# Patient Record
Sex: MALE | Race: WHITE | NOT HISPANIC OR LATINO | Employment: OTHER | URBAN - METROPOLITAN AREA
[De-identification: names, ages, dates, MRNs, and addresses within clinical notes are randomized per-mention and may not be internally consistent; named-entity substitution may affect disease eponyms.]

---

## 2017-01-06 ENCOUNTER — HOSPITAL ENCOUNTER (OUTPATIENT)
Dept: CARDIOLOGY | Facility: HOSPITAL | Age: 77
Discharge: HOME | End: 2017-01-06
Attending: INTERNAL MEDICINE | Admitting: INTERNAL MEDICINE

## 2017-01-06 VITALS
RESPIRATION RATE: 18 BRPM | SYSTOLIC BLOOD PRESSURE: 154 MMHG | BODY MASS INDEX: 26.48 KG/M2 | DIASTOLIC BLOOD PRESSURE: 86 MMHG | WEIGHT: 185 LBS | HEIGHT: 70 IN

## 2017-01-06 DIAGNOSIS — R06.02 SHORTNESS OF BREATH: ICD-10-CM

## 2017-01-06 PROCEDURE — 93017 CV STRESS TEST TRACING ONLY: CPT

## 2017-01-06 PROCEDURE — 25010000002 SULFUR HEXAFLUORIDE MICROSPH 60.7-25 MG RECONSTITUTED SUSPENSION: Performed by: INTERNAL MEDICINE

## 2017-01-06 PROCEDURE — 93018 CV STRESS TEST I&R ONLY: CPT | Performed by: INTERNAL MEDICINE

## 2017-01-06 PROCEDURE — 93351 STRESS TTE COMPLETE: CPT

## 2017-01-06 PROCEDURE — 93350 STRESS TTE ONLY: CPT | Performed by: INTERNAL MEDICINE

## 2017-01-06 PROCEDURE — C8928 TTE W OR W/O FOL W/CON,STRES: HCPCS

## 2017-01-06 PROCEDURE — 93350 STRESS TTE ONLY: CPT

## 2017-01-06 RX ADMIN — SULFUR HEXAFLUORIDE 5 ML: KIT at 15:12

## 2017-01-12 LAB
BH CV ECHO MEAS - BSA(HAYCOCK): 2 M^2
BH CV ECHO MEAS - BSA: 2 M^2
BH CV ECHO MEAS - BZI_BMI: 27.3 KILOGRAMS/M^2
BH CV ECHO MEAS - BZI_METRIC_HEIGHT: 175.3 CM
BH CV ECHO MEAS - BZI_METRIC_WEIGHT: 83.9 KG
BH CV STRESS BP STAGE 1: NORMAL
BH CV STRESS BP STAGE 2: NORMAL
BH CV STRESS DURATION MIN STAGE 1: 3
BH CV STRESS DURATION MIN STAGE 2: 2
BH CV STRESS DURATION SEC STAGE 1: 0
BH CV STRESS DURATION SEC STAGE 2: 5
BH CV STRESS GRADE STAGE 1: 10
BH CV STRESS GRADE STAGE 2: 12
BH CV STRESS HR STAGE 1: 116
BH CV STRESS HR STAGE 2: 137
BH CV STRESS METS STAGE 1: 5
BH CV STRESS METS STAGE 2: 7.5
BH CV STRESS PROTOCOL 1: NORMAL
BH CV STRESS RECOVERY BP: NORMAL MMHG
BH CV STRESS RECOVERY HR: 67 BPM
BH CV STRESS SPEED STAGE 1: 1.7
BH CV STRESS SPEED STAGE 2: 2.5
BH CV STRESS STAGE 1: 1
BH CV STRESS STAGE 2: 2
MAXIMAL PREDICTED HEART RATE: 146 BPM
PERCENT MAX PREDICTED HR: 93.84 %
STRESS BASELINE BP: NORMAL MMHG
STRESS BASELINE HR: 59 BPM
STRESS PERCENT HR: 110 %
STRESS POST ESTIMATED WORKLOAD: 7 METS
STRESS POST EXERCISE DUR MIN: 5 MIN
STRESS POST EXERCISE DUR SEC: 5 SEC
STRESS POST PEAK BP: NORMAL MMHG
STRESS POST PEAK HR: 137 BPM
STRESS TARGET HR: 124 BPM

## 2017-10-25 ENCOUNTER — OFFICE VISIT (OUTPATIENT)
Dept: CARDIOLOGY | Facility: CLINIC | Age: 77
End: 2017-10-25

## 2017-10-25 VITALS
HEART RATE: 64 BPM | SYSTOLIC BLOOD PRESSURE: 141 MMHG | HEIGHT: 69 IN | BODY MASS INDEX: 27.16 KG/M2 | WEIGHT: 183.4 LBS | DIASTOLIC BLOOD PRESSURE: 92 MMHG

## 2017-10-25 DIAGNOSIS — Z95.2 S/P AVR (AORTIC VALVE REPLACEMENT): ICD-10-CM

## 2017-10-25 DIAGNOSIS — I10 ESSENTIAL HYPERTENSION: ICD-10-CM

## 2017-10-25 DIAGNOSIS — E78.5 DYSLIPIDEMIA: ICD-10-CM

## 2017-10-25 DIAGNOSIS — I25.9 ISCHEMIC HEART DISEASE: Primary | ICD-10-CM

## 2017-10-25 PROCEDURE — 99214 OFFICE O/P EST MOD 30 MIN: CPT | Performed by: INTERNAL MEDICINE

## 2017-10-25 NOTE — PROGRESS NOTES
Subjective:     Encounter Date:10/25/2017    Patient ID: Sherron Boyle is a 77 y.o.  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     PHYSICIAN:  Sandy Corcoran MD  REMOTE INTERNIST: Jose Martin Munguia MD  CARDIOVASCULAR SURGEON: Kali Curry MD   UROLOGIST:  Jayce Delgado MD  SLEEP MD:  Khloe Caputo MD    Chief Complaint:   Chief Complaint   Patient presents with   • Shortness of Breath     Problem List:  1. 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 Remote progressive NYHA class II-III exertional dyspnea/fatigue syndrome with CCS class II-III chest pain syndrome with abnormal EKG and abnormal echocardiogram with acceptable Cardiolite GXT. LVEF (0.58), July 2006.   b. Severe 2.5-vessel obstructive coronary artery disease with 3-vessel coronary disease and severe aortic stenosis with subsequent aortocoronary bypass graft placement x2 (RSVG PDA and RCA; RSVG distal OM nondominant LCx) with #21 Magna precordial aortic valve replacement and reduction aortic valve annuloplasty with possible small non ST elevation myocardial infarction and acceptable discharge echocardiographic study, August 2006.   c. Residual CCS class I anginal pectoris/NYHA class II exertional dyspnea/fatigue syndrome with acceptable echocardiogram. LVEF (0.62), December 2007.   d. BRENDA revealing grade II atheromatous disease of the transverse aorta and evidence of grade III atheroma in the descending aorta (atheroma less than or equal to 5 mm) with mild concentric LVH, normal sinus rhythm with negative IV saline contrast study and LVEF estimated at greater than 60%, 09/17/2009.  e. Acceptable echocardiographic GXT. LVEF (0.64) with nominal aortic valve function and mild left atrial enlargement with acceptable ascending, transverse, and proximal descending thoracic aorta, November  2009.  f. Remote dizziness/presyncope with transient chest pain and abnormal event recorder with acceptable combination Doppler echocardiogram and carotid duplex study as well as Cardiolite GXT, spring 2013, with residual class I symptoms.  g. Residual class I symptoms with acceptable stable echocardiogram. LVEF (0.72), December 2011; LVEF (0.65), November 2014.  h. Remote CCS class II chest pain/NYHA class II exertional dyspnea with acceptable limited echocardiographic GXT with exercise to 93% PMHR and only 75% predicted exercise capacity, January 2017.  i. Residual class I symptoms.  2. Chronic hypertension, probable essential.   3. Dyslipidemia.  4. Remote tobacco use, resolved.   5. Remote pneumonia, 1970.  6. Remote football injury with concussion, 1950.  7. Chronic lower tract obstructive symptoms, probable BPH.   8. Post-procedure weight gain and possible mild volume overload, October 2006.  9. Chronic hypothyroidism/replacement therapy.   10. Apparent bladder cancer with therapy and intermittent recurrent cystoscopy, autumn 2006.  11. Remote left podagra/possible gout attack -- data deficit, November 2008.  12. Erectile dysfunction.   13. Stage 3 chronic kidney disease.   14. Sleep apnea with abnormal outpatient screening study with subsequent apparent polysomnogram, data deficit (March 2016).      Allergies   Allergen Reactions   • Lipitor [Atorvastatin] Myalgia     arthralgias/myalgias.   • Lisinopril Cough     paroxysmal cough.         Current Outpatient Prescriptions:   •  aspirin 81 MG EC tablet, Take 81 mg by mouth Daily., Disp: , Rfl:   •  atorvastatin (LIPITOR) 20 MG tablet, 40 mg Daily., Disp: , Rfl:   •  levothyroxine (SYNTHROID, LEVOTHROID) 25 MCG tablet, Daily., Disp: , Rfl:     HISTORY OF PRESENT ILLNESS: Patient returns for scheduled 10-month followup. He has done well since last being seen in our office; he has been push-mowing 3 lots since his riding mower broke down and has no symptoms from  "a cardiopulmonary perspective with this activity.  He also walks some and takes his dog for a walk and does some golfing.  He has not been skiing anymore but is planning a trip to [place] next year with the friend that he used to ski with.  The results of his stress test in January 2017 are discussed with him.  He continues to do some work at the [employer].  He states that he has noticed in the past few weeks a soreness in his chest; he notes it is not a pain but is there all the time.  It does not get worse with activity.  He gets a little short of breath with going up and down stairs, but the mowing does not bother him.  He reports that he has changed physicians because his wife is no longer working for Dr. Munguia; he notes that Dr. Corcoran, his new doctor, took him off a lot of his medicines.  He has already had influenza immunization.  He states that he just had bilateral arterial studies on his legs and will print the report for us and mail it to our office.  Patient otherwise denies prolonged chest pain, shortness of breath, PND, edema, palpitations, syncope or presyncope at this time.        Review of Systems   Musculoskeletal: Positive for arthritis.      Obtained and otherwise negative except as outlined in problem list and HPI.    Procedures       Objective:       Vitals:    10/25/17 1134 10/25/17 1135   BP: 128/83 141/92   BP Location: Right arm Right arm   Patient Position: Sitting Standing   Pulse: 59 64   Weight: 183 lb 6.4 oz (83.2 kg)    Height: 69\" (175.3 cm)      Body mass index is 27.08 kg/(m^2).   Last weight:  186 lbs.    Physical Exam   Constitutional: He is oriented to person, place, and time. He appears well-developed and well-nourished.   HENT:   Mouth/Throat: Oropharynx is clear and moist and mucous membranes are normal. He does not have dentures. No oral lesions. Abnormal dentition. No dental abscesses, uvula swelling, lacerations or dental caries.   Neck: No JVD present. Carotid bruit is not " present. No thyromegaly present.   Cardiovascular: Regular rhythm, S1 normal and S2 normal.  Exam reveals no gallop, no S3 and no friction rub.    Murmur heard.   Medium-pitched harsh early systolic murmur is present with a grade of 2/6  at the upper right sternal border radiating to the neck  Pulses:       Carotid pulses are 1+ on the right side, and 1+ on the left side.       Radial pulses are 1+ on the right side, and 1+ on the left side.        Femoral pulses are 1+ on the right side, and 1+ on the left side.       Popliteal pulses are 1+ on the right side, and 1+ on the left side.        Dorsalis pedis pulses are 1+ on the right side, and 1+ on the left side.        Posterior tibial pulses are 1+ on the right side, and 1+ on the left side.   Pulmonary/Chest: Effort normal. He has decreased breath sounds. He has no wheezes. He has no rhonchi. He has no rales.   Abdominal: Soft. He exhibits no mass. There is no hepatosplenomegaly. There is no tenderness. There is no guarding.   Bowel sounds audible x4   Musculoskeletal: Normal range of motion. He exhibits no edema.   Lymphadenopathy:     He has no cervical adenopathy.   Neurological: He is alert and oriented to person, place, and time.   Skin: Skin is warm, dry and intact. No rash noted.   Vitals reviewed.        Lab Review: 06/27/2017 (reviewed with patient in office)  · Uric acid - 4.8  · Vitamin B12 - 418  · CBC - WBC 6.1, hemoglobin 14.9, hematocrit 42.4%, with normal indices, platelet count, and differential  · TSH - 3.17  · T4 - WNL  · FLP - total cholesterol 141, triglycerides 98, HDL-C 43, LDL-C 78  · CMP - normal electrolytes, serum creatinine 1.2, BUN 22, glucose 83, albumin 4.1, with normal LFTs, calcium 9.4          Assessment:   Overall continued acceptable course with no interim cardiopulmonary complaints with acceptable functional status. We will defer additional diagnostic or therapeutic intervention from a cardiac perspective at this time.  I do  not feel at this time he is having angina pectoris or CHF symptoms, but if he begins having exertional chest pain or dyspnea, he is to notify us promptly via the 1-800 number.  He is aware of the need for AHA SBE prophylaxis PRN cause and has been compliant in this regard.       Diagnosis Plan   1. Ischemic heart disease  No recurrent angina pectoris or CHF.     2. Essential hypertension  Acceptable control   3. S/P AVR (aortic valve replacement)  Acceptable examination   4. Dyslipidemia  Acceptable lipid profile          Plan:         1. Patient to continue current medications and close follow up with the above providers.  2. Tentative cardiology follow up in June 2018, or patient may return sooner PRN.       Transcribed by Shania Saldana for Dr. Chandu Pereira at 11:45 AM on 10/25/2017    I, Chandu Pereira MD, [facility], personally performed the services described in this documentation as scribed by the above named individual in my presence, and it is both accurate and complete. At 11:54 AM on 10/25/2017

## 2018-07-17 NOTE — PROGRESS NOTES
Subjective:     Encounter Date:07/26/2018    Patient ID: Sherron Boyle is a 78 y.o.  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     PHYSICIAN:  Sandy Corcoran MD  REMOTE INTERNIST: Jose Martin Munguia MD  CARDIOVASCULAR SURGEON: Kali Curry MD   UROLOGIST:  Jayce Delgado MD  SLEEP MD:  Khloe Caputo MD    Chief Complaint:   Chief Complaint   Patient presents with   • Coronary Artery Disease     Problem List:  1. 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 Remote progressive NYHA class II-III exertional dyspnea/fatigue syndrome with CCS class II-III chest pain syndrome with abnormal EKG and abnormal echocardiogram with acceptable Cardiolite GXT. LVEF (0.58), July 2006.   b. Severe 2.5-vessel obstructive coronary artery disease with 3-vessel coronary disease and severe aortic stenosis with subsequent aortocoronary bypass graft placement x2 (RSVG PDA and RCA; RSVG distal OM nondominant LCx) with #21 Magna precordial aortic valve replacement and reduction aortic valve annuloplasty with possible small non ST elevation myocardial infarction and acceptable discharge echocardiographic study, August 2006.   c. Residual CCS class I anginal pectoris/NYHA class II exertional dyspnea/fatigue syndrome with acceptable echocardiogram. LVEF (0.62), December 2007.   d. BRENDA revealing grade II atheromatous disease of the transverse aorta and evidence of grade III atheroma in the descending aorta (atheroma less than or equal to 5 mm) with mild concentric LVH, normal sinus rhythm with negative IV saline contrast study and LVEF estimated at greater than 60%, 09/17/2009.  e. Acceptable echocardiographic GXT. LVEF (0.64) with nominal aortic valve function and mild left atrial enlargement with acceptable ascending, transverse, and proximal descending thoracic aorta, November  2009.  f. Remote dizziness/presyncope with transient chest pain and abnormal event recorder with acceptable combination Doppler echocardiogram and carotid duplex study as well as Cardiolite GXT, spring 2013, with residual class I symptoms.  g. Residual class I symptoms with acceptable stable echocardiogram. LVEF (0.72), December 2011; LVEF (0.65), November 2014.  h. Remote CCS class II chest pain/NYHA class II exertional dyspnea with acceptable limited echocardiographic GXT with exercise to 93% PMHR and only 75% predicted exercise capacity, January 2017.  i. Residual class I symptoms.  2. Chronic hypertension, probable essential.   3. Dyslipidemia.  4. Remote tobacco use, resolved.   5. Remote pneumonia, 1970.  6. Remote football injury with concussion, 1950.  7. Chronic lower tract obstructive symptoms, probable BPH.   8. Post-procedure weight gain and possible mild volume overload, October 2006.  9. Chronic hypothyroidism/replacement therapy.   10. Apparent bladder cancer with therapy and intermittent recurrent cystoscopy, autumn 2006.  11. Remote left podagra/possible gout attack -- data deficit, November 2008.  12. Erectile dysfunction.   13. Stage 3 chronic kidney disease.   14. Sleep apnea with abnormal outpatient screening study with subsequent apparent polysomnogram, data deficit (March 2016).    Allergies   Allergen Reactions   • Lipitor [Atorvastatin] Myalgia     arthralgias/myalgias.   • Lisinopril Cough     paroxysmal cough.         Current Outpatient Prescriptions:   •  aspirin 81 MG EC tablet, Take 81 mg by mouth Daily., Disp: , Rfl:   •  atorvastatin (LIPITOR) 20 MG tablet, 40 mg Daily., Disp: , Rfl:   •  levothyroxine (SYNTHROID, LEVOTHROID) 25 MCG tablet, Daily., Disp: , Rfl:     HISTORY OF PRESENT ILLNESS:  Patient is here for an 8 month follow-up. He had an acceptable stress echocardiogram in January 2017.  He says that he has been doing well since his last office visit.  He works 5 hours a day  "and has no symptoms from a cardiopulmonary perspective with his activities.  He notes that his blood pressure typically runs lower than it is today.  He had labs drawn in Dr. Corcoran's office and asked them to send the results to us, but we have not received them; he does not recall how long it has been since those were done.  He has not eaten today, so he is willing to have labs drawn today.  He has tingling in his feet and has \"had all kinds of tests done,\" but they have not found anything.  He does not smoke but he does use dip.  He quit about a year and a half ago for a year, but he decided he did not need to \"be craving something\" all the time and that he was getting old, so he restarted.  He denies palpitations, presyncope,or syncope. He had questions on when to have his next echocardiogram to assess his valve replacement.      Review of Systems   Hematologic/Lymphatic: Bruises/bleeds easily.   Musculoskeletal: Positive for arthritis.      Obtained and otherwise negative except as outlined in problem list and HPI.    Procedures       Objective:       Vitals:    07/26/18 1449 07/26/18 1452 07/26/18 1503   BP: 147/84 137/83 122/78   BP Location: Left arm Left arm Left arm   Patient Position: Sitting Standing Sitting   Pulse: 59 63    Weight: 81.6 kg (180 lb) 81.6 kg (180 lb)    Height: 175.3 cm (69\") 175.3 cm (69\")      Body mass index is 26.58 kg/m².  Last weight October 2017 was 183 pounds    Physical Exam   Constitutional: He is oriented to person, place, and time. He appears well-developed and well-nourished.   Neck: No JVD present. Carotid bruit is present. No thyromegaly present.   Cardiovascular: Regular rhythm, S1 normal and S2 normal.  Exam reveals no gallop, no S3 and no friction rub.    Murmur heard.   Medium-pitched harsh early systolic murmur is present with a grade of 2/6  at the upper right sternal border radiating to the neck  Pulses:       Dorsalis pedis pulses are 1+ on the right side, and 1+ on " the left side.        Posterior tibial pulses are 1+ on the right side, and 1+ on the left side.   Pulmonary/Chest: Effort normal. He has decreased breath sounds. He has no wheezes. He has no rhonchi. He has no rales.   Abdominal: Soft. He exhibits no mass. There is no hepatosplenomegaly. There is no tenderness. There is no guarding.   Bowel sounds audible x4   Musculoskeletal: Normal range of motion. He exhibits no edema.   Lymphadenopathy:     He has no cervical adenopathy.   Neurological: He is alert and oriented to person, place, and time.   Skin: Skin is warm, dry and intact. No rash noted.   Vitals reviewed.        Lab Review: None to review          Assessment:       Overall continued acceptable course with no interim cardiopulmonary complaints with acceptable functional status. We will defer additional diagnostic or therapeutic intervention from a cardiac perspective at this time. He had an acceptable stress echocardiogram in January 2017. We would recommend an echocardiogram to assess his previous aortic valve replacement in 2019. Encouraged the patient to discontinue smokeless tobacco use.     Diagnosis Plan   1. Essential hypertension  Lipid Panel    CBC & Differential    TSH    Comprehensive Metabolic Panel    Lipid Panel    CBC & Differential    TSH    Comprehensive Metabolic Panel   2. S/P AVR (aortic valve replacement)  Lipid Panel    CBC & Differential    TSH    Comprehensive Metabolic Panel    Lipid Panel    CBC & Differential    TSH    Comprehensive Metabolic Panel   3. Dyslipidemia  Lipid Panel    CBC & Differential    TSH    Comprehensive Metabolic Panel    Lipid Panel    CBC & Differential    TSH    Comprehensive Metabolic Panel   4. Ischemic heart disease            Plan:         1. Patient to continue current medications and close follow up with the above providers.  2. The following laboratory studies will be drawn today:   A. CMP   B. CBC   C. FLP   D. TSH  3. Tentative cardiology follow up  in July 2019 with same-day echocardiogram, or patient may return sooner PRN.  4. Encouraged cessation of smokeless tobacco      Transcribed by Shania Saldana for ICNDY Swift at 3:00 PM on 07/26/2018    CINDY Wiseman

## 2018-07-26 ENCOUNTER — LAB REQUISITION (OUTPATIENT)
Dept: LAB | Facility: HOSPITAL | Age: 78
End: 2018-07-26

## 2018-07-26 ENCOUNTER — OFFICE VISIT (OUTPATIENT)
Dept: CARDIOLOGY | Facility: CLINIC | Age: 78
End: 2018-07-26

## 2018-07-26 VITALS
HEIGHT: 69 IN | SYSTOLIC BLOOD PRESSURE: 122 MMHG | WEIGHT: 180 LBS | HEART RATE: 63 BPM | BODY MASS INDEX: 26.66 KG/M2 | DIASTOLIC BLOOD PRESSURE: 78 MMHG

## 2018-07-26 DIAGNOSIS — Z95.2 PRESENCE OF PROSTHETIC HEART VALVE: ICD-10-CM

## 2018-07-26 DIAGNOSIS — E78.5 DYSLIPIDEMIA: ICD-10-CM

## 2018-07-26 DIAGNOSIS — Z95.2 S/P AVR (AORTIC VALVE REPLACEMENT): ICD-10-CM

## 2018-07-26 DIAGNOSIS — I10 ESSENTIAL (PRIMARY) HYPERTENSION: ICD-10-CM

## 2018-07-26 DIAGNOSIS — E78.5 HYPERLIPIDEMIA: ICD-10-CM

## 2018-07-26 DIAGNOSIS — I25.9 ISCHEMIC HEART DISEASE: ICD-10-CM

## 2018-07-26 DIAGNOSIS — I10 ESSENTIAL HYPERTENSION: Primary | ICD-10-CM

## 2018-07-26 DIAGNOSIS — Z00.00 ROUTINE GENERAL MEDICAL EXAMINATION AT A HEALTH CARE FACILITY: ICD-10-CM

## 2018-07-26 LAB
ALBUMIN SERPL-MCNC: 4.68 G/DL (ref 3.2–4.8)
ALBUMIN/GLOB SERPL: 1.9 G/DL (ref 1.5–2.5)
ALP SERPL-CCNC: 62 U/L (ref 25–100)
ALT SERPL-CCNC: 22 U/L (ref 7–40)
AST SERPL-CCNC: 23 U/L (ref 0–33)
BASOPHILS # BLD AUTO: 0.01 10*3/MM3 (ref 0–0.2)
BASOPHILS NFR BLD AUTO: 0.1 % (ref 0–1)
BILIRUB SERPL-MCNC: 1.6 MG/DL (ref 0.3–1.2)
BUN SERPL-MCNC: 23 MG/DL (ref 9–23)
BUN/CREAT SERPL: 18.5 (ref 7–25)
CALCIUM SERPL-MCNC: 9.8 MG/DL (ref 8.7–10.4)
CHLORIDE SERPL-SCNC: 105 MMOL/L (ref 99–109)
CHOLEST SERPL-MCNC: 155 MG/DL (ref 0–200)
CO2 SERPL-SCNC: 30 MMOL/L (ref 20–31)
CREAT SERPL-MCNC: 1.24 MG/DL (ref 0.6–1.3)
EOSINOPHIL # BLD AUTO: 0.06 10*3/MM3 (ref 0–0.3)
EOSINOPHIL NFR BLD AUTO: 0.9 % (ref 0–3)
ERYTHROCYTE [DISTWIDTH] IN BLOOD BY AUTOMATED COUNT: 13.6 % (ref 11.3–14.5)
GLOBULIN SER CALC-MCNC: 2.4 GM/DL
GLUCOSE SERPL-MCNC: 79 MG/DL (ref 70–100)
HCT VFR BLD AUTO: 47.7 % (ref 38.9–50.9)
HDLC SERPL-MCNC: 56 MG/DL (ref 40–60)
HGB BLD-MCNC: 16.1 G/DL (ref 13.1–17.5)
IMM GRANULOCYTES # BLD: 0.01 10*3/MM3 (ref 0–0.03)
IMM GRANULOCYTES NFR BLD: 0.1 % (ref 0–0.6)
LDLC SERPL CALC-MCNC: 74 MG/DL (ref 0–100)
LYMPHOCYTES # BLD AUTO: 1.39 10*3/MM3 (ref 0.6–4.8)
LYMPHOCYTES NFR BLD AUTO: 20.3 % (ref 24–44)
MCH RBC QN AUTO: 28.9 PG (ref 27–31)
MCHC RBC AUTO-ENTMCNC: 33.8 G/DL (ref 32–36)
MCV RBC AUTO: 85.5 FL (ref 80–99)
MONOCYTES # BLD AUTO: 0.63 10*3/MM3 (ref 0–1)
MONOCYTES NFR BLD AUTO: 9.2 % (ref 0–12)
NEUTROPHILS # BLD AUTO: 4.74 10*3/MM3 (ref 1.5–8.3)
NEUTROPHILS NFR BLD AUTO: 69.4 % (ref 41–71)
PLATELET # BLD AUTO: 190 10*3/MM3 (ref 150–450)
POTASSIUM SERPL-SCNC: 4 MMOL/L (ref 3.5–5.5)
PROT SERPL-MCNC: 7.1 G/DL (ref 5.7–8.2)
RBC # BLD AUTO: 5.58 10*6/MM3 (ref 4.2–5.76)
SODIUM SERPL-SCNC: 145 MMOL/L (ref 132–146)
TRIGL SERPL-MCNC: 123 MG/DL (ref 0–150)
TSH SERPL DL<=0.005 MIU/L-ACNC: 0.71 MIU/ML (ref 0.35–5.35)
VLDLC SERPL CALC-MCNC: 24.6 MG/DL
WBC # BLD AUTO: 6.84 10*3/MM3 (ref 3.5–10.8)

## 2018-07-26 PROCEDURE — 36415 COLL VENOUS BLD VENIPUNCTURE: CPT | Performed by: NURSE PRACTITIONER

## 2018-07-26 PROCEDURE — 99214 OFFICE O/P EST MOD 30 MIN: CPT | Performed by: NURSE PRACTITIONER

## 2019-02-18 ENCOUNTER — TELEPHONE (OUTPATIENT)
Dept: CARDIOLOGY | Facility: CLINIC | Age: 79
End: 2019-02-18

## 2019-02-18 NOTE — TELEPHONE ENCOUNTER
Eileen Boyle called in reference to Sherron Boyle her .  She has become quite concerned about him with increased SOA, becoming pale in color, with frequent headaches.     Message sent to M[facility]am to try and move his appointment up.    Wife was advised if she feels his situation is getting worse then to report to the E.D.

## 2019-02-26 NOTE — PROGRESS NOTES
Subjective:     Encounter Date:02/27/2019      Patient ID: Sherron Boyle is a 78 y.o.   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     PHYSICIAN:  Sandy Corcoran MD  REMOTE INTERNIST: Jose Martin Munguia MD  CARDIOVASCULAR SURGEON: Kali Curry MD   UROLOGIST:  Jayce Delgado MD  SLEEP MD:  Khloe Caputo MD .    Chief Complaint:   Chief Complaint   Patient presents with   • Ischemic heart disease   • Chest Pain   • Shortness of Breath     Problem List:  1. 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 Remote progressive NYHA class II-III exertional dyspnea/fatigue syndrome with CCS class II-III chest pain syndrome with abnormal EKG and abnormal echocardiogram with acceptable Cardiolite GXT. LVEF (0.58), July 2006.   b. Severe 2.5-vessel obstructive coronary artery disease with 3-vessel coronary disease and severe aortic stenosis with subsequent aortocoronary bypass graft placement x2 (RSVG PDA and RCA; RSVG distal OM nondominant LCx) with #21 Magna precordial aortic valve replacement and reduction aortic valve annuloplasty with possible small non ST elevation myocardial infarction and acceptable discharge echocardiographic study, August 2006.   c. Residual CCS class I anginal pectoris/NYHA class II exertional dyspnea/fatigue syndrome with acceptable echocardiogram. LVEF (0.62), December 2007.   d. BRENDA revealing grade II atheromatous disease of the transverse aorta and evidence of grade III atheroma in the descending aorta (atheroma less than or equal to 5 mm) with mild concentric LVH, normal sinus rhythm with negative IV saline contrast study and LVEF estimated at greater than 60%, 09/17/2009.  e. Acceptable echocardiographic GXT. LVEF (0.64) with nominal aortic valve function and mild left atrial enlargement with acceptable ascending, transverse, and  proximal descending thoracic aorta, November 2009.  f. Remote dizziness/presyncope with transient chest pain and abnormal event recorder with acceptable combination Doppler echocardiogram and carotid duplex study as well as Cardiolite GXT, spring 2013, with residual class I symptoms.  g. Residual class I symptoms with acceptable stable echocardiogram. LVEF (0.72), December 2011; LVEF (0.65), November 2014.  h. Remote CCS class II chest pain/NYHA class II exertional dyspnea with acceptable limited echocardiographic GXT with exercise to 93% PMHR and only 75% predicted exercise capacity, January 2017.  i. CCS class I chest discomfort/NYHA class II dyspnea on exertion.  2. Chronic hypertension, probable essential.   3. Dyslipidemia.  4. Remote tobacco use, resolved.   5. Remote pneumonia, 1970.  6. Remote football injury with concussion, 1950.  7. Chronic lower tract obstructive symptoms, probable BPH.   8. Post-procedure weight gain and possible mild volume overload, October 2006.  9. Chronic hypothyroidism/replacement therapy.   10. Apparent bladder cancer with therapy and intermittent recurrent cystoscopy, autumn 2006.  11. Remote left podagra/possible gout attack -- data deficit, November 2008.  12. Erectile dysfunction.   13. Stage 3 chronic kidney disease.   14. Sleep apnea with abnormal outpatient screening study with subsequent apparent polysomnogram, data deficit (March 2016).  Allergies   Allergen Reactions   • Lipitor [Atorvastatin] Myalgia     arthralgias/myalgias.   • Lisinopril Cough     paroxysmal cough.         Current Outpatient Medications:   •  aspirin 81 MG EC tablet, Take 81 mg by mouth Daily., Disp: , Rfl:   •  atorvastatin (LIPITOR) 20 MG tablet, Take 40 mg by mouth Every Night., Disp: , Rfl:   •  levothyroxine (SYNTHROID, LEVOTHROID) 25 MCG tablet, Take 25 mcg by mouth Daily., Disp: , Rfl:     HISTORY OF PRESENT ILLNESS:  Patient is here for a 6-month follow-up.  At his last appointment I was  "planning on obtaining a repeat echocardiogram in July 2019 the same day as his next appointment to assess his aortic valve.  He was also encouraged to stop using smokeless tobacco. His wife called our office 2/18/19 with complaints that her  had been more SOA and fatigued.  He had recent laboratory testing but did not have a CBC.  He denies any melena, recent illness, fever, chills, edema, palpitations, presyncope, or syncope.  He occasionally will have right-sided chest pain with no radiation or other symptoms.  He has noticed lately that he has been more short of breath with exertion.  He does not have orthopnea or PND.  He had questions regarding the status of his aortic valve and wondered if this is why he has become short of breath.  He is currently using 1 can of smokeless tobacco every 4-5 days.  He was encouraged for cessation and I offered Chantix and nicotine patches in office.  He states that he would like to quit on his own but is not ready to do this at this time.  His blood pressure at home has been in the 130s systolic.  He previously was on lisinopril but this was discontinued due to cough.  Then he was switched to losartan but his blood pressure was getting too low so this has been discontinued.  He receives antibiotics before he has dental procedures.  He is still working 5 hours a day.    Review of Systems   Constitution: Positive for night sweats.   Respiratory: Positive for shortness of breath.    Musculoskeletal: Positive for arthritis, joint pain and joint swelling.      Obtained and otherwise negative except as outlined in problem list and HPI.    Procedures       Objective:       Vitals:    02/27/19 1418 02/27/19 1422   BP: 147/84 145/82   BP Location: Left arm Left arm   Patient Position: Sitting Standing   Pulse: 65 66   SpO2: 98%    Weight: 84.3 kg (185 lb 12.8 oz)    Height: 175.3 cm (69\")    Recheck blood pressure left arm sitting was 142/70  Body mass index is 27.44 kg/m².  Last " weight in July 2018 was 180 pounds  Physical Exam   Constitutional: He is oriented to person, place, and time. He appears well-developed and well-nourished.   HENT:   Mouth/Throat: Oropharynx is clear and moist and mucous membranes are normal.   Neck: No JVD present. Carotid bruit is present. No thyromegaly present.   Cardiovascular: Regular rhythm, S1 normal and S2 normal. Exam reveals no gallop, no S3 and no friction rub.   Murmur heard.   Medium-pitched harsh early systolic murmur is present with a grade of 2/6 at the upper right sternal border radiating to the neck.  Pulses:       Dorsalis pedis pulses are 1+ on the right side, and 1+ on the left side.        Posterior tibial pulses are 1+ on the right side, and 1+ on the left side.   Pulmonary/Chest: Effort normal. He has decreased breath sounds. He has no wheezes. He has no rhonchi. He has no rales.   Abdominal: Soft. He exhibits no mass. There is no hepatosplenomegaly. There is no tenderness. There is no guarding.   Bowel sounds audible x4   Musculoskeletal: Normal range of motion. He exhibits no edema.   Lymphadenopathy:     He has no cervical adenopathy.   Neurological: He is alert and oriented to person, place, and time.   Skin: Skin is warm, dry and intact. No rash noted.   Vitals reviewed.        Lab Review:   Lab Results   Component Value Date    BUN 23 07/26/2018    CREATININE 1.24 07/26/2018    EGFRIFNONA 57 (L) 07/26/2018    EGFRIFAFRI 69 07/26/2018    BCR 18.5 07/26/2018    CO2 30.0 07/26/2018    CALCIUM 9.8 07/26/2018    PROTENTOTREF 7.1 07/26/2018    ALBUMIN 4.68 07/26/2018    LABIL2 1.9 07/26/2018    AST 23 07/26/2018    ALT 22 07/26/2018       Lab Results   Component Value Date    WBC 6.84 07/26/2018    HGB 16.1 07/26/2018    HCT 47.7 07/26/2018    MCV 85.5 07/26/2018     07/26/2018       Lab Results   Component Value Date    TSH 0.712 07/26/2018       Lab Results   Component Value Date    TRIG 123 07/26/2018     Lab Results   Component  Value Date    HDL 56 07/26/2018     Lab Results   Component Value Date    LDL 74 07/26/2018 2/8/19:  CMP: Glucose 76, BUN 29, creatinine 1.32, GFR 52, sodium 142, potassium 4.8, chloride 104, carbon dioxide 29, calcium 9.9, protein 6.8, albumin 4.6, globulin 2.2, bilirubin 1.4, alkaline phosphatase 65, AST 16, ALT 17  Lipid panel: Cholesterol 174, HDL 59, triglycerides 167, LDL 88  TSH 1.14         Assessment:    Patient has had increased shortness of breath so I will order an echocardiogram to assess heart structure/function in view of his prior aortic valve replacement as well as a CBC, BNP, and d-dimer to rule out anemia, heart failure, or clot formation.  I encouraged the patient to monitor his blood pressure at home and to call us if his blood pressure is remaining 140 systolic or higher. I advised the patient of the risks of continuing to use tobacco, and I provided this patient with smoking cessation educational materials and discussed how to quit smoking and patient has not expressed the willingness to quit.  Counseled patient for 3-5 minutes       Diagnosis Plan   1. Dyspnea, unspecified type  BNP    D-dimer, Quantitative    CBC & Differential   2. Ischemic heart disease   echocardiogram   3. S/P AVR (aortic valve replacement)   echocardiogram   4. Shortness of breath   echocardiogram, BNP, d-dimer, CBC   5. Essential hypertension   controlled, patient to monitor his blood pressure at home   6. Dyslipidemia   acceptable   7. Smokeless tobacco use: cessation encouraged       Plan:         1. Patient to continue current medications and close follow up with the above providers.  2. Tentative cardiology follow up in 3 months or patient may return sooner PRN.  3. Echocardiogram today after this appointment  4. BNP, d-dimer, CBC  5. Needs smokeless tobacco cessation      .Electronically signed by CINDY Wiseman, 02/27/19, 3:27 PM.

## 2019-02-27 ENCOUNTER — OFFICE VISIT (OUTPATIENT)
Dept: CARDIOLOGY | Facility: CLINIC | Age: 79
End: 2019-02-27

## 2019-02-27 ENCOUNTER — HOSPITAL ENCOUNTER (OUTPATIENT)
Dept: CARDIOLOGY | Facility: HOSPITAL | Age: 79
Discharge: HOME | End: 2019-02-27
Admitting: INTERNAL MEDICINE

## 2019-02-27 VITALS
OXYGEN SATURATION: 98 % | HEART RATE: 66 BPM | DIASTOLIC BLOOD PRESSURE: 82 MMHG | WEIGHT: 185.8 LBS | BODY MASS INDEX: 27.52 KG/M2 | HEIGHT: 69 IN | SYSTOLIC BLOOD PRESSURE: 145 MMHG

## 2019-02-27 VITALS — BODY MASS INDEX: 27.4 KG/M2 | WEIGHT: 185 LBS | HEIGHT: 69 IN

## 2019-02-27 DIAGNOSIS — E78.5 DYSLIPIDEMIA: ICD-10-CM

## 2019-02-27 DIAGNOSIS — Z95.2 S/P AVR (AORTIC VALVE REPLACEMENT): ICD-10-CM

## 2019-02-27 DIAGNOSIS — R06.00 DYSPNEA, UNSPECIFIED TYPE: ICD-10-CM

## 2019-02-27 DIAGNOSIS — I10 ESSENTIAL HYPERTENSION: ICD-10-CM

## 2019-02-27 DIAGNOSIS — R06.00 DYSPNEA, UNSPECIFIED TYPE: Primary | ICD-10-CM

## 2019-02-27 DIAGNOSIS — Z72.0 SMOKELESS TOBACCO USE: ICD-10-CM

## 2019-02-27 DIAGNOSIS — I25.9 ISCHEMIC HEART DISEASE: ICD-10-CM

## 2019-02-27 DIAGNOSIS — R06.02 SHORTNESS OF BREATH: ICD-10-CM

## 2019-02-27 PROCEDURE — 99406 BEHAV CHNG SMOKING 3-10 MIN: CPT | Performed by: NURSE PRACTITIONER

## 2019-02-27 PROCEDURE — 99214 OFFICE O/P EST MOD 30 MIN: CPT | Performed by: NURSE PRACTITIONER

## 2019-02-27 PROCEDURE — 93306 TTE W/DOPPLER COMPLETE: CPT

## 2019-02-27 PROCEDURE — 93306 TTE W/DOPPLER COMPLETE: CPT | Performed by: INTERNAL MEDICINE

## 2019-02-28 ENCOUNTER — RESULTS ENCOUNTER (OUTPATIENT)
Dept: CARDIOLOGY | Facility: CLINIC | Age: 79
End: 2019-02-28

## 2019-02-28 DIAGNOSIS — R06.00 DYSPNEA, UNSPECIFIED TYPE: ICD-10-CM

## 2019-02-28 LAB
BASOPHILS # BLD AUTO: 0 X10E3/UL (ref 0–0.2)
BASOPHILS NFR BLD AUTO: 0 %
BNP SERPL-MCNC: NORMAL PG/ML
D DIMER PPP FEU-MCNC: NORMAL MG/L FEU
EOSINOPHIL # BLD AUTO: 0.1 X10E3/UL (ref 0–0.4)
EOSINOPHIL NFR BLD AUTO: 2 %
ERYTHROCYTE [DISTWIDTH] IN BLOOD BY AUTOMATED COUNT: 13.4 % (ref 12.3–15.4)
HCT VFR BLD AUTO: 44.2 % (ref 37.5–51)
HGB BLD-MCNC: 15.7 G/DL (ref 13–17.7)
IMM GRANULOCYTES # BLD AUTO: 0 X10E3/UL (ref 0–0.1)
IMM GRANULOCYTES NFR BLD AUTO: 0 %
LYMPHOCYTES # BLD AUTO: 1.9 X10E3/UL (ref 0.7–3.1)
LYMPHOCYTES NFR BLD AUTO: 31 %
Lab: NORMAL
MCH RBC QN AUTO: 30 PG (ref 26.6–33)
MCHC RBC AUTO-ENTMCNC: 35.5 G/DL (ref 31.5–35.7)
MCV RBC AUTO: 84 FL (ref 79–97)
MONOCYTES # BLD AUTO: 0.7 X10E3/UL (ref 0.1–0.9)
MONOCYTES NFR BLD AUTO: 11 %
NEUTROPHILS # BLD AUTO: 3.4 X10E3/UL (ref 1.4–7)
NEUTROPHILS NFR BLD AUTO: 56 %
PLATELET # BLD AUTO: 168 X10E3/UL (ref 150–379)
RBC # BLD AUTO: 5.24 X10E6/UL (ref 4.14–5.8)
REQUEST PROBLEM: NORMAL
SPECIMEN STATUS: NORMAL
WBC # BLD AUTO: 6.1 X10E3/UL (ref 3.4–10.8)

## 2019-03-04 ENCOUNTER — RESULTS ENCOUNTER (OUTPATIENT)
Dept: CARDIOLOGY | Facility: CLINIC | Age: 79
End: 2019-03-04

## 2019-03-04 DIAGNOSIS — R06.00 DYSPNEA, UNSPECIFIED TYPE: ICD-10-CM

## 2019-03-04 LAB
BH CV ECHO MEAS - AO MAX PG (FULL): 30.2 MMHG
BH CV ECHO MEAS - AO MAX PG: 33.5 MMHG
BH CV ECHO MEAS - AO MEAN PG (FULL): 17.7 MMHG
BH CV ECHO MEAS - AO MEAN PG: 19.5 MMHG
BH CV ECHO MEAS - AO ROOT AREA (BSA CORRECTED): 1.6
BH CV ECHO MEAS - AO ROOT AREA: 8.2 CM^2
BH CV ECHO MEAS - AO ROOT DIAM: 3.2 CM
BH CV ECHO MEAS - AO V2 MAX: 289.2 CM/SEC
BH CV ECHO MEAS - AO V2 MEAN: 207 CM/SEC
BH CV ECHO MEAS - AO V2 VTI: 71.4 CM
BH CV ECHO MEAS - AVA(I,A): 0.91 CM^2
BH CV ECHO MEAS - AVA(I,D): 0.91 CM^2
BH CV ECHO MEAS - AVA(V,A): 0.85 CM^2
BH CV ECHO MEAS - AVA(V,D): 0.85 CM^2
BH CV ECHO MEAS - BSA(HAYCOCK): 2 M^2
BH CV ECHO MEAS - BSA: 2 M^2
BH CV ECHO MEAS - BZI_BMI: 27.3 KILOGRAMS/M^2
BH CV ECHO MEAS - BZI_METRIC_HEIGHT: 175.3 CM
BH CV ECHO MEAS - BZI_METRIC_WEIGHT: 83.9 KG
BH CV ECHO MEAS - EDV(CUBED): 111.6 ML
BH CV ECHO MEAS - EDV(MOD-SP4): 66 ML
BH CV ECHO MEAS - EDV(TEICH): 108.3 ML
BH CV ECHO MEAS - EF(CUBED): 72.3 %
BH CV ECHO MEAS - EF(MOD-BP): 66 %
BH CV ECHO MEAS - EF(MOD-SP4): 59.1 %
BH CV ECHO MEAS - EF(TEICH): 63.9 %
BH CV ECHO MEAS - ESV(CUBED): 30.9 ML
BH CV ECHO MEAS - ESV(MOD-SP4): 27 ML
BH CV ECHO MEAS - ESV(TEICH): 39.1 ML
BH CV ECHO MEAS - FS: 34.8 %
BH CV ECHO MEAS - IVS/LVPW: 1
BH CV ECHO MEAS - IVSD: 0.9 CM
BH CV ECHO MEAS - LAD MAJOR: 5.8 CM
BH CV ECHO MEAS - LAT PEAK E' VEL: 8.2 CM/SEC
BH CV ECHO MEAS - LATERAL E/E' RATIO: 10.7
BH CV ECHO MEAS - LV DIASTOLIC VOL/BSA (35-75): 33 ML/M^2
BH CV ECHO MEAS - LV MASS(C)D: 140.3 GRAMS
BH CV ECHO MEAS - LV MASS(C)DI: 70.2 GRAMS/M^2
BH CV ECHO MEAS - LV MAX PG: 3.3 MMHG
BH CV ECHO MEAS - LV MEAN PG: 1.7 MMHG
BH CV ECHO MEAS - LV SYSTOLIC VOL/BSA (12-30): 13.5 ML/M^2
BH CV ECHO MEAS - LV V1 MAX: 90.6 CM/SEC
BH CV ECHO MEAS - LV V1 MEAN: 61 CM/SEC
BH CV ECHO MEAS - LV V1 VTI: 23.8 CM
BH CV ECHO MEAS - LVIDD: 4.8 CM
BH CV ECHO MEAS - LVIDS: 3.1 CM
BH CV ECHO MEAS - LVLD AP4: 7.5 CM
BH CV ECHO MEAS - LVLS AP4: 6.3 CM
BH CV ECHO MEAS - LVOT AREA (M): 2.8 CM^2
BH CV ECHO MEAS - LVOT AREA: 2.7 CM^2
BH CV ECHO MEAS - LVOT DIAM: 1.9 CM
BH CV ECHO MEAS - LVPWD: 0.9 CM
BH CV ECHO MEAS - MED PEAK E' VEL: 6.3 CM/SEC
BH CV ECHO MEAS - MEDIAL E/E' RATIO: 14
BH CV ECHO MEAS - MV A MAX VEL: 101.3 CM/SEC
BH CV ECHO MEAS - MV DEC TIME: 0.25 SEC
BH CV ECHO MEAS - MV E MAX VEL: 89.2 CM/SEC
BH CV ECHO MEAS - MV E/A: 0.88
BH CV ECHO MEAS - PA ACC SLOPE: 748.1 CM/SEC^2
BH CV ECHO MEAS - PA ACC TIME: 0.08 SEC
BH CV ECHO MEAS - PA PR(ACCEL): 41 MMHG
BH CV ECHO MEAS - PULM DIAS VEL: 30.2 CM/SEC
BH CV ECHO MEAS - PULM S/D: 1.4
BH CV ECHO MEAS - PULM SYS VEL: 43.3 CM/SEC
BH CV ECHO MEAS - RAP SYSTOLE: 3 MMHG
BH CV ECHO MEAS - RVSP: 25 MMHG
BH CV ECHO MEAS - SI(AO): 292.6 ML/M^2
BH CV ECHO MEAS - SI(CUBED): 40.4 ML/M^2
BH CV ECHO MEAS - SI(LVOT): 32.4 ML/M^2
BH CV ECHO MEAS - SI(MOD-SP4): 19.5 ML/M^2
BH CV ECHO MEAS - SI(TEICH): 34.6 ML/M^2
BH CV ECHO MEAS - SV(AO): 584.8 ML
BH CV ECHO MEAS - SV(CUBED): 80.7 ML
BH CV ECHO MEAS - SV(LVOT): 64.7 ML
BH CV ECHO MEAS - SV(MOD-SP4): 39 ML
BH CV ECHO MEAS - SV(TEICH): 69.2 ML
BH CV ECHO MEAS - TR MAX PG: 22 MMHG
BH CV ECHO MEAS - TR MAX VEL: 232.6 CM/SEC
BH CV ECHO MEASUREMENTS AVERAGE E/E' RATIO: 12.3
BH CV VAS BP LEFT ARM: NORMAL MMHG
BH CV XLRA - RV BASE: 4.4 CM
BH CV XLRA - RV LENGTH: 8.4 CM
BH CV XLRA - RV MID: 3.6 CM
LV EF 2D ECHO EST: 66 %

## 2019-03-06 ENCOUNTER — APPOINTMENT (OUTPATIENT)
Dept: LAB | Facility: HOSPITAL | Age: 79
End: 2019-03-06

## 2019-03-06 PROCEDURE — 83880 ASSAY OF NATRIURETIC PEPTIDE: CPT | Performed by: NURSE PRACTITIONER

## 2019-03-06 PROCEDURE — 85007 BL SMEAR W/DIFF WBC COUNT: CPT | Performed by: NURSE PRACTITIONER

## 2019-03-06 PROCEDURE — 85379 FIBRIN DEGRADATION QUANT: CPT | Performed by: NURSE PRACTITIONER

## 2019-03-06 PROCEDURE — 85025 COMPLETE CBC W/AUTO DIFF WBC: CPT | Performed by: NURSE PRACTITIONER

## 2019-03-06 PROCEDURE — 36415 COLL VENOUS BLD VENIPUNCTURE: CPT | Performed by: NURSE PRACTITIONER

## 2019-03-07 LAB
BNP SERPL-MCNC: 27 PG/ML (ref 0–100)
D DIMER PPP FEU-MCNC: 0.84 MCGFEU/ML (ref 0–0.5)
DEPRECATED RDW RBC AUTO: 40.9 FL (ref 37–54)
ERYTHROCYTE [DISTWIDTH] IN BLOOD BY AUTOMATED COUNT: 13.4 % (ref 11.5–14.5)
HCT VFR BLD AUTO: 46.3 % (ref 42–52)
HGB BLD-MCNC: 16.2 G/DL (ref 14–18)
LYMPHOCYTES # BLD MANUAL: 2.18 10*3/MM3 (ref 1–3)
LYMPHOCYTES NFR BLD MANUAL: 22 % (ref 0–12)
LYMPHOCYTES NFR BLD MANUAL: 38 % (ref 16–46)
MCH RBC QN AUTO: 29.3 PG (ref 27–33)
MCHC RBC AUTO-ENTMCNC: 35 G/DL (ref 33–37)
MCV RBC AUTO: 83.7 FL (ref 80–94)
MONOCYTES # BLD AUTO: 1.26 10*3/MM3 (ref 0.1–0.9)
NEUTROPHILS # BLD AUTO: 2.29 10*3/MM3 (ref 1.4–6.5)
NEUTROPHILS NFR BLD MANUAL: 40 % (ref 40–75)
PLAT MORPH BLD: NORMAL
PLATELET # BLD AUTO: 193 10*3/MM3 (ref 130–400)
PMV BLD AUTO: 10.5 FL (ref 6–10)
RBC # BLD AUTO: 5.53 10*6/MM3 (ref 4.7–6.1)
RBC MORPH BLD: NORMAL
SCAN SLIDE: NORMAL
WBC NRBC COR # BLD: 5.73 10*3/MM3 (ref 4.5–12.5)

## 2019-03-08 ENCOUNTER — DOCUMENTATION (OUTPATIENT)
Dept: CARDIOLOGY | Facility: CLINIC | Age: 79
End: 2019-03-08

## 2019-03-08 NOTE — PROGRESS NOTES
Patient called to tell him that his echocardiogram and recent laboratory results were abnormal but acceptable. Patient said he started having increased bp with 160's systolic after his last appointment with me. His PCP has started him on losartan 25mg daily and his bp is now in the 130's systolic. I encouraged him to continue taking this medication and to call us in 2 weeks if he continues to have SOB and we may schedule a repeat stress test in view of his history of IHD.     Electronically signed by CINDY Wiseman, 03/08/19, 1:38 PM.

## 2019-10-02 NOTE — PROGRESS NOTES
Subjective:     Encounter Date:10/10/2019      Patient ID: Sherron Boyle is a 79 y.o.  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     PHYSICIAN:  Sandy Corcoran MD  REMOTE INTERNIST: Jose Martin Munguia MD  CARDIOVASCULAR SURGEON: Kali Curry MD   UROLOGIST:  Jayce Delgado MD  SLEEP MD:  Khloe Caputo MD ..    Chief Complaint:   Chief Complaint   Patient presents with   • Shortness of Breath     Problem List:  1. 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 Remote progressive NYHA class II-III exertional dyspnea/fatigue syndrome with CCS class II-III chest pain syndrome with abnormal EKG and abnormal echocardiogram with acceptable Cardiolite GXT. LVEF (0.58), July 2006.   b. Severe 2.5-vessel obstructive coronary artery disease with 3-vessel coronary disease and severe aortic stenosis with subsequent aortocoronary bypass graft placement x2 (RSVG PDA and RCA; RSVG distal OM nondominant LCx) with #21 Magna precordial aortic valve replacement and reduction aortic valve annuloplasty with possible small non ST elevation myocardial infarction and acceptable discharge echocardiographic study, August 2006.   c. Residual CCS class I anginal pectoris/NYHA class II exertional dyspnea/fatigue syndrome with acceptable echocardiogram. LVEF (0.62), December 2007.   d. BRENDA revealing grade II atheromatous disease of the transverse aorta and evidence of grade III atheroma in the descending aorta (atheroma less than or equal to 5 mm) with mild concentric LVH, normal sinus rhythm with negative IV saline contrast study and LVEF estimated at greater than 60%, 09/17/2009.  e. Acceptable echocardiographic GXT. LVEF (0.64) with nominal aortic valve function and mild left atrial enlargement with acceptable ascending, transverse, and proximal descending thoracic aorta, November  2009.  f. Remote dizziness/presyncope with transient chest pain and abnormal event recorder with acceptable combination Doppler echocardiogram and carotid duplex study as well as Cardiolite GXT, spring 2013, with residual class I symptoms.  g. Residual class I symptoms with acceptable stable echocardiogram. LVEF (0.72), December 2011; LVEF (0.65), November 2014.  h. Remote CCS class II chest pain/NYHA class II exertional dyspnea with acceptable limited echocardiographic GXT with exercise to 93% PMHR and only 75% predicted exercise capacity, January 2017.  i. Echocardiogram 2/27/2019: LV systolic function normal, mild TR, RVSP 25 mmHg, mild to moderate aortic stenosis, LV diastolic dysfunction grade 1 consistent with impaired relaxation, normal RV wall thickness, systolic function and septal motion noted with RV cavity mildly dilated, no pericardial effusion, no pulmonary hypertension, moderate MAC present, there is a bioprosthetic valve present with aortic peak and mean gradients elevated.  j. CCS class I chest discomfort/NYHA class II dyspnea on exertion.  2. Chronic hypertension, probable essential.   3. Dyslipidemia; on statin therapy.  4. Remote tobacco use, with current smokeless tobacco use of 1 can/week.   5. Remote pneumonia, 1970.  6. Remote football injury with concussion, 1950.  7. Chronic lower tract obstructive symptoms, probable BPH.   8. Post-procedure weight gain and possible mild volume overload, October 2006.  9. Chronic hypothyroidism/replacement therapy.   10. Apparent bladder cancer with therapy and intermittent recurrent cystoscopy, autumn 2006.  11. Remote left podagra/possible gout attack -- data deficit, November 2008.  12. Erectile dysfunction.   13. Stage 3 chronic kidney disease.   14. Sleep apnea with abnormal outpatient screening study with subsequent apparent polysomnogram, data deficit (March 2016).  15. Ankle edema  Allergies   Allergen Reactions   • Lipitor [Atorvastatin] Myalgia      arthralgias/myalgias.   • Lisinopril Cough     paroxysmal cough.         Current Outpatient Medications:   •  aspirin 81 MG EC tablet, Take 81 mg by mouth Daily., Disp: , Rfl:   •  atorvastatin (LIPITOR) 20 MG tablet, Take 40 mg by mouth Every Night., Disp: , Rfl:   •  fluticasone (FLONASE) 50 MCG/ACT nasal spray, Daily., Disp: , Rfl:   •  levothyroxine (SYNTHROID, LEVOTHROID) 25 MCG tablet, Take 25 mcg by mouth Daily., Disp: , Rfl:   •  loratadine (CLARITIN) 10 MG tablet, Take 10 mg by mouth Daily., Disp: , Rfl:   •  losartan (COZAAR) 25 MG tablet, 25 mg Daily., Disp: , Rfl:     HISTORY OF PRESENT ILLNESS:  Patient is here for a 7-month follow-up.  At his last appointment he was encouraged for smokeless tobacco cessation and he is still currently using smokeless tobacco 1 can/week.  He is not interested in cessation at this time but will try to decrease his use.  He had an echocardiogram in February 2019 which demonstrated mild to moderate aortic stenosis with a EF 66%.  He denies any chest pain, increased shortness of breath, edema, palpitations, dizziness, presyncope, or syncope.  He had one episode of lightheadedness when he was at work one day that was brief and thought that it was due to dehydration because he had not been drinking the amount of water that he had been in view of the heat.  He has not had any recurrent dizziness or lightheadedness since that time and it was an isolated incident.  The patient brought his laboratory results with him today for me to review which were acceptable.  The patient occasionally will have some ankle edema if he is on his feet too much but he does try to prop up his feet at night if he is watching television.      Review of Systems   HENT: Positive for hearing loss.    Cardiovascular: Positive for leg swelling.   Respiratory: Positive for cough.    Musculoskeletal: Positive for arthritis.   Gastrointestinal: Positive for heartburn.   Neurological: Positive for  "light-headedness.      All other systems reviewed and otherwise negative.    Procedures       Objective:       Vitals:    10/10/19 1512 10/10/19 1516   BP: 140/78 143/83   BP Location: Left arm Left arm   Patient Position: Sitting Standing   Pulse: 60 62   SpO2: 98% 98%   Weight: 82.5 kg (181 lb 12.8 oz)    Height: 175.3 cm (69\")    Recheck blood pressure right arm sitting was 130/60  Body mass index is 26.85 kg/m².  Last weight February 2019 was 185 pounds  Physical Exam   Constitutional: He is oriented to person, place, and time. He appears well-developed and well-nourished.   Neck: No JVD present. Carotid bruit is present. No thyromegaly present.   Cardiovascular: Regular rhythm, S1 normal and S2 normal. Exam reveals no gallop, no S3 and no friction rub.   Murmur heard.   Medium-pitched harsh early systolic murmur is present with a grade of 2/6 at the upper right sternal border radiating to the neck.  Pulses:       Carotid pulses are 1+ on the right side, and 1+ on the left side.       Radial pulses are 1+ on the right side, and 1+ on the left side.        Femoral pulses are 1+ on the right side, and 1+ on the left side.       Popliteal pulses are 1+ on the right side, and 1+ on the left side.        Dorsalis pedis pulses are 1+ on the right side, and 1+ on the left side.        Posterior tibial pulses are 1+ on the right side, and 1+ on the left side.   Pulmonary/Chest: Effort normal. He has decreased breath sounds. He has no wheezes. He has no rhonchi. He has no rales.   Abdominal: Soft. He exhibits no mass. There is no hepatosplenomegaly. There is no tenderness. There is no guarding.   Bowel sounds audible x4   Musculoskeletal: Normal range of motion. He exhibits no edema.   Lymphadenopathy:     He has no cervical adenopathy.   Neurological: He is alert and oriented to person, place, and time.   Skin: Skin is warm, dry and intact. No rash noted.   Vitals reviewed.        Lab Review:   Lab Results   Component " Value Date    BUN 23 07/26/2018    CREATININE 1.24 07/26/2018    EGFRIFNONA 57 (L) 07/26/2018    EGFRIFAFRI 69 07/26/2018    BCR 18.5 07/26/2018    CO2 30.0 07/26/2018    CALCIUM 9.8 07/26/2018    PROTENTOTREF 7.1 07/26/2018    ALBUMIN 4.68 07/26/2018    LABIL2 1.9 07/26/2018    AST 23 07/26/2018    ALT 22 07/26/2018       Lab Results   Component Value Date    WBC 5.73 03/06/2019    HGB 16.2 03/06/2019    HCT 46.3 03/06/2019    MCV 83.7 03/06/2019     03/06/2019       Lab Results   Component Value Date    TSH 0.712 07/26/2018       Lab Results   Component Value Date    TRIG 123 07/26/2018     Lab Results   Component Value Date    HDL 56 07/26/2018     Lab Results   Component Value Date    LDL 74 07/26/2018       Lab Results   Component Value Date    BNP 27.0 03/06/2019 2/8/19:  · CMP: Glucose 76, BUN 29, creatinine 1.32, GFR 52, sodium 142, potassium 4.8, chloride 104, carbon dioxide 29, calcium 9.9, protein 6.8, albumin 4.6, globulin 2.2, bilirubin 1.4, alkaline phosphatase 65, AST 16, ALT 17  · Lipid panel: Cholesterol 174, HDL 59, triglycerides 167, LDL 88  · TSH 1.14  ·   · 10/7/2019 (reviewed with patient in office today):  · CBC: WBC 5.1, RBC 4.92, hemoglobin 14.9, hematocrit 41.1, MCV 83.5, MCH 30.3, MCHC 36.3, RDW 13.6, platelets 194, MPV 10.3, neutrophils 65, lymphocytes 24.2, monocytes 8.8, eos 1.2, basos 0.8  · TSH 2.52  · CMP: Glucose 81, BUN 28, creatinine 1.15, GFR 61, sodium 140, potassium 3.5, chloride 107, carbon dioxide 23, calcium 9.5, protein 6.5, albumin 4.4, globulin 2.1, early Willam 1.7, alkaline phosphatase 58, AST 18, ALT 16  · Lipid panel: Cholesterol 156, HDL 59, triglycerides 89, LDL 80      Echocardiogram 2/27/2019:  · Left ventricular systolic function is normal.  · Mild tricuspid valve regurgitation is present.  · Calculated right ventricular systolic pressure from tricuspid regurgitation is 25 mmHg.  · Mild to moderate aortic valve stenosis is present.  · Estimated EF =  66%.  · Left ventricular diastolic dysfunction (grade I) consistent with impaired relaxation.  · Normal right ventricular wall thickness, systolic function and septal motion noted with right ventricular cavity mildly dilated.  · There is no evidence of pericardial effusion.  · No evidence of pulmonary hypertension is present.  · Moderate MAC is present.  · There is a bioprosthetic valve present with the aortic valve peak and mean gradients elevated.    Assessment:     Overall continued acceptable course with no new interim cardiopulmonary complaints with acceptable functional status. We will defer additional diagnostic or therapeutic intervention from a cardiac perspective at this time.  The patient's echocardiogram from February 2019 demonstrated mild to moderate aortic stenosis with EF 66%.  The patient's laboratory results were reviewed with him in office today and were acceptable.  The patient is still using smokeless tobacco 1 can/week but is not interested in cessation but will try to decrease his use.  The patient occasionally will have ankle edema so I provided him with torsemide 5 mg daily as needed for increased edema.       Diagnosis Plan   1. Ischemic heart disease   Stable, continue losartan, aspirin, atorvastatin,  no recurrent angina   2. Essential hypertension   Controlled, continue losartan   3. Dyslipidemia   Acceptable lipid panel October 2019, continue atorvastatin   4. Smokeless tobacco use  The patient is still using smokeless tobacco 1 can/week but is not interested in cessation but will try to decrease his use   5. S/P AVR (aortic valve replacement)   Acceptable echocardiogram February 2019 demonstrating mild to moderate aortic stenosis   6.  Ankle edema: Torsemide 5 mg daily as needed for increased edema       Plan:         1. Patient to continue current medications and close follow up with the above providers.  2. Tentative cardiology follow up in 1 year or patient may return sooner  PRN.  3. Torsemide 5mg daily PRN  4. Patient encouraged for tobacco cessation and he will try to decrease his use of smokeless tobacco      Electronically signed by CINDY Wiseman, 10/10/19, 5:46 PM.

## 2019-10-10 ENCOUNTER — OFFICE VISIT (OUTPATIENT)
Dept: CARDIOLOGY | Facility: CLINIC | Age: 79
End: 2019-10-10

## 2019-10-10 VITALS
WEIGHT: 181.8 LBS | HEART RATE: 62 BPM | HEIGHT: 69 IN | BODY MASS INDEX: 26.93 KG/M2 | OXYGEN SATURATION: 98 % | SYSTOLIC BLOOD PRESSURE: 143 MMHG | DIASTOLIC BLOOD PRESSURE: 83 MMHG

## 2019-10-10 DIAGNOSIS — I25.9 ISCHEMIC HEART DISEASE: Primary | ICD-10-CM

## 2019-10-10 DIAGNOSIS — Z72.0 SMOKELESS TOBACCO USE: ICD-10-CM

## 2019-10-10 DIAGNOSIS — Z95.2 S/P AVR (AORTIC VALVE REPLACEMENT): ICD-10-CM

## 2019-10-10 DIAGNOSIS — I10 ESSENTIAL HYPERTENSION: ICD-10-CM

## 2019-10-10 DIAGNOSIS — M25.472 ANKLE EDEMA, BILATERAL: ICD-10-CM

## 2019-10-10 DIAGNOSIS — E78.5 DYSLIPIDEMIA: ICD-10-CM

## 2019-10-10 DIAGNOSIS — M25.471 ANKLE EDEMA, BILATERAL: ICD-10-CM

## 2019-10-10 PROCEDURE — 99214 OFFICE O/P EST MOD 30 MIN: CPT | Performed by: NURSE PRACTITIONER

## 2019-10-10 RX ORDER — LORATADINE 10 MG/1
10 TABLET ORAL DAILY
COMMUNITY
End: 2020-10-14

## 2019-10-10 RX ORDER — FLUTICASONE PROPIONATE 50 MCG
2 SPRAY, SUSPENSION (ML) NASAL DAILY
COMMUNITY
Start: 2019-10-07 | End: 2020-10-14

## 2019-10-10 RX ORDER — TORSEMIDE 5 MG/1
5 TABLET ORAL DAILY PRN
Qty: 30 TABLET | Refills: 5 | Status: SHIPPED | OUTPATIENT
Start: 2019-10-10 | End: 2022-12-14

## 2019-10-10 RX ORDER — LOSARTAN POTASSIUM 25 MG/1
50 TABLET ORAL DAILY
COMMUNITY
Start: 2019-09-29 | End: 2021-04-29 | Stop reason: SDUPTHER

## 2020-10-14 ENCOUNTER — OFFICE VISIT (OUTPATIENT)
Dept: CARDIOLOGY | Facility: CLINIC | Age: 80
End: 2020-10-14

## 2020-10-14 VITALS
SYSTOLIC BLOOD PRESSURE: 116 MMHG | DIASTOLIC BLOOD PRESSURE: 62 MMHG | OXYGEN SATURATION: 98 % | WEIGHT: 184 LBS | HEIGHT: 69 IN | BODY MASS INDEX: 27.25 KG/M2 | HEART RATE: 64 BPM

## 2020-10-14 DIAGNOSIS — E78.5 DYSLIPIDEMIA: ICD-10-CM

## 2020-10-14 DIAGNOSIS — M25.472 ANKLE EDEMA, BILATERAL: ICD-10-CM

## 2020-10-14 DIAGNOSIS — Z95.2 S/P AVR (AORTIC VALVE REPLACEMENT): ICD-10-CM

## 2020-10-14 DIAGNOSIS — M25.471 ANKLE EDEMA, BILATERAL: ICD-10-CM

## 2020-10-14 DIAGNOSIS — I25.9 ISCHEMIC HEART DISEASE: Primary | ICD-10-CM

## 2020-10-14 DIAGNOSIS — Z72.0 SMOKELESS TOBACCO USE: ICD-10-CM

## 2020-10-14 DIAGNOSIS — I10 ESSENTIAL HYPERTENSION: ICD-10-CM

## 2020-10-14 PROCEDURE — 99214 OFFICE O/P EST MOD 30 MIN: CPT | Performed by: INTERNAL MEDICINE

## 2020-10-14 RX ORDER — ATORVASTATIN CALCIUM 40 MG/1
1 TABLET, FILM COATED ORAL DAILY
COMMUNITY
Start: 2020-09-08

## 2020-10-14 NOTE — PROGRESS NOTES
Subjective:     Encounter Date:10/14/2020    Patient ID: Sherron Boyle is a 80 y.o.  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     PHYSICIAN:  Sandy Corcoran MD  REMOTE INTERNIST: Jose Martin Munguia MD  CARDIOVASCULAR SURGEON: Kali Curry MD   UROLOGIST:  Jayce Delgado MD  SLEEP MD:  Khloe Caputo MD    Chief Complaint:   Chief Complaint   Patient presents with   • Ischemic heart disease       Problem List:  1. 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 Remote progressive NYHA class II-III exertional dyspnea/fatigue syndrome with CCS class II-III chest pain syndrome with abnormal EKG and abnormal echocardiogram with acceptable Cardiolite GXT. LVEF (0.58), July 2006.   b. Severe 2.5-vessel obstructive coronary artery disease with 3-vessel coronary disease and severe aortic stenosis with subsequent aortocoronary bypass graft placement x2 (RSVG PDA and RCA; RSVG distal OM nondominant LCx) with #21 Magna precordial aortic valve replacement and reduction aortic valve annuloplasty with possible small non ST elevation myocardial infarction and acceptable discharge echocardiographic study, August 2006.   c. Residual CCS class I anginal pectoris/NYHA class II exertional dyspnea/fatigue syndrome with acceptable echocardiogram. LVEF (0.62), December 2007.   d. BRENDA revealing grade II atheromatous disease of the transverse aorta and evidence of grade III atheroma in the descending aorta (atheroma less than or equal to 5 mm) with mild concentric LVH, normal sinus rhythm with negative IV saline contrast study and LVEF estimated at greater than 60%, 09/17/2009.  e. Acceptable echocardiographic GXT. LVEF (0.64) with nominal aortic valve function and mild left atrial enlargement with acceptable ascending, transverse, and proximal descending thoracic aorta, November  2009.  f. Remote dizziness/presyncope with transient chest pain and abnormal event recorder with acceptable combination Doppler echocardiogram and carotid duplex study as well as Cardiolite GXT, spring 2013, with residual class I symptoms.  g. Residual class I symptoms with acceptable stable echocardiogram. LVEF (0.72), December 2011; LVEF (0.65), November 2014.  h. Remote CCS class II chest pain/NYHA class II exertional dyspnea with acceptable limited echocardiographic GXT with exercise to 93% PMHR and only 75% predicted exercise capacity, January 2017.  i. Echocardiogram 2/27/2019: LV systolic function normal, mild TR, RVSP 25 mmHg, mild to moderate aortic stenosis, LV diastolic dysfunction grade 1 consistent with impaired relaxation, normal RV wall thickness, systolic function and septal motion noted with RV cavity mildly dilated, no pericardial effusion, no pulmonary hypertension, moderate MAC present, there is a bioprosthetic valve present with aortic peak and mean gradients elevated.  j. CCS class I chest discomfort/NYHA class II dyspnea on exertion.  2. Chronic hypertension, probable essential.   3. Dyslipidemia; on statin therapy.  4. Remote tobacco use, with current smokeless tobacco use of 1 can/week.   5. Remote pneumonia, 1970.  6. Remote football injury with concussion, 1950.  7. Chronic lower tract obstructive symptoms, probable BPH.   8. Post-procedure weight gain and possible mild volume overload, October 2006.  9. Chronic hypothyroidism/replacement therapy.   10. Apparent bladder cancer with therapy and intermittent recurrent cystoscopy, autumn 2006.  11. Remote left podagra/possible gout attack -- data deficit, November 2008.  12. Erectile dysfunction.   13. Stage 3 chronic kidney disease.   14. Sleep apnea with abnormal outpatient screening study with subsequent apparent polysomnogram, data deficit (March 2016).  15. Ankle edema      Allergies   Allergen Reactions   • Lipitor [Atorvastatin]  "Myalgia     arthralgias/myalgias.   • Lisinopril Cough     paroxysmal cough.       Current Outpatient Medications:   •  aspirin 81 MG EC tablet, Take 81 mg by mouth Daily., Disp: , Rfl:   •  atorvastatin (LIPITOR) 40 MG tablet, Take 1 tablet by mouth Daily., Disp: , Rfl:   •  levothyroxine (SYNTHROID, LEVOTHROID) 25 MCG tablet, Take 25 mcg by mouth Daily., Disp: , Rfl:   •  losartan (COZAAR) 25 MG tablet, Take 25 mg by mouth Daily., Disp: , Rfl:   •  torsemide (DEMADEX) 5 MG tablet, Take 1 tablet by mouth Daily As Needed (increased edema)., Disp: 30 tablet, Rfl: 5    History of Present Illness: Patient returns for scheduled annual follow up.  He denies any hospitalizations, surgeries, or new diagnoses since he was last seen.  The patient is active and uses a push mower to mow his lawn.  He has some mild shortness of breath on exertion but not any worse than last year.  He denies any edema, chest pain, presyncope, or syncope.  He had 1 episode when he was lying still in bed where he felt a \"surge\" where his heart was racing for a couple seconds and then it resolved.  He has not had any other recurrent episodes of this.  His blood pressure is acceptable at home.  He gets antibiotics before he has dental procedures.  He had a filling semirecently and was compliant with antibiotics then.  He does not have any recent laboratory testing results to review.  He still dips and uses 1 can every 4-5 days.  He states that this is less than he used to dip. He had his influenza vaccination a couple weeks ago. He has had no interim ER visits, hospitalizations, serious illnesses, or surgeries.         Review of Systems   All other systems reviewed and are negative.     Obtained and negative except as outlined in problem list and HPI.    Procedures       Objective:       Vitals:    10/14/20 1528 10/14/20 1531   BP: 110/70 116/62   BP Location: Left arm Left arm   Patient Position: Sitting Standing   Pulse: 63 64   SpO2: 98%  " "  Weight: 83.5 kg (184 lb)    Height: 175.3 cm (69\")      Body mass index is 27.17 kg/m².  Last weight: 181 lbs    Vitals signs reviewed.   Constitutional:       Appearance: Well-developed.   HENT:    Mouth/Throat:      Mouth: Mucous membranes are moist. No injury, lacerations, oral lesions or angioedema.      Dentition: Normal dentition. Does not have dentures. No dental tenderness, gingival swelling, dental caries, dental abscesses or gum lesions.      Tongue: No lesions. Tongue does not deviate from midline.      Palate: No mass and lesions.      Pharynx: Oropharynx is clear. Uvula midline. No pharyngeal swelling, oropharyngeal exudate, posterior oropharyngeal erythema or uvula swelling.      Tonsils: No tonsillar exudate or tonsillar abscesses.   Neck:      Thyroid: No thyromegaly.      Vascular: Carotid bruit present. No JVD.      Lymphadenopathy: No cervical adenopathy.   Pulmonary:      Effort: Pulmonary effort is normal.      Breath sounds: Decreased breath sounds present. No wheezing. No rhonchi. No rales.   Cardiovascular:      Regular rhythm.      Murmurs: There is a grade 2/6 mid frequency harsh early systolic murmur at the URSB, radiating to the neck.      No gallop. No S3 gallop.   Pulses:     Dorsalis pedis: 1+ bilaterally.     Posterior tibial: 1+ bilaterally.  Edema:     Peripheral edema absent.   Abdominal:      Palpations: Abdomen is soft. There is no abdominal mass.      Tenderness: There is no abdominal tenderness. There is no guarding.   Musculoskeletal: Normal range of motion.   Skin:     General: Skin is warm and dry.      Findings: No rash.   Neurological:      Mental Status: Alert and oriented to person, place, and time.           Lab Review: No recent lab results available for review.      Lab Results   Component Value Date    BUN 23 07/26/2018    CREATININE 1.24 07/26/2018    EGFRIFNONA 57 (L) 07/26/2018    EGFRIFAFRI 69 07/26/2018    BCR 18.5 07/26/2018     07/26/2018    K 4.0 " 07/26/2018     07/26/2018    CO2 30.0 07/26/2018    CALCIUM 9.8 07/26/2018    PROTENTOTREF 7.1 07/26/2018    ALBUMIN 4.68 07/26/2018    LABIL2 1.9 07/26/2018    AST 23 07/26/2018    ALT 22 07/26/2018       Lab Results   Component Value Date    WBC 5.73 03/06/2019    HGB 16.2 03/06/2019    HCT 46.3 03/06/2019    MCV 83.7 03/06/2019     03/06/2019       Lab Results   Component Value Date    TSH 0.712 07/26/2018       Lab Results   Component Value Date    CHLPL 155 07/26/2018     Lab Results   Component Value Date    TRIG 123 07/26/2018     Lab Results   Component Value Date    HDL 56 07/26/2018     Lab Results   Component Value Date    LDL 74 07/26/2018             Assessment:       Overall continued acceptable course with no new interim cardiopulmonary complaints with acceptable functional status. We will defer additional diagnostic or therapeutic intervention from a cardiac perspective at this time.  We will obtain a new echocardiogram the same day as his next appointment in view of his previous aortic valve replacement. I advised the patient of the risks of continuing to use tobacco, and I provided this patient with smoking cessation educational materials and discussed how to quit smoking and patient has not expressed the willingness to quit.  Counseled patient for 3-5 minutes.  He is still using 1 can of dip every 4-5 days.     Diagnosis Plan   1. Ischemic heart disease  No recurrent angina pectoris or CHF on current activity schedule; continue current treatment    2. Essential hypertension  Acceptable control; Continue current treatment.    3. Dyslipidemia  No new data to review; continue atorvastatin   4. Smokeless tobacco use  Cessation encouraged   5. S/P AVR (aortic valve replacement)  Stable examination; Continue current treatment. Echocardiogram same day as next appt   6. Ankle edema, bilateral  Stable examination; Continue current treatment.           Plan:         1. Patient to continue  current medications and close follow up with the above providers.  2. Tentative cardiology follow up in October 2021 with same day echocardiogram or patient may return sooner PRN.  3. Tobacco cessation encouraged  4. 1 800 card issued as well as fax number if he has interim laboratory studies.     Scribed for Chandu Pereira MD by Clarisse Vernon, APRN. 10/14/2020  16:06 EDT    I, Chandu Pereira MD, [facility], personally performed the services described in this documentation as scribed by the above named individual in my presence, and it is both accurate and complete. At 16:08 EDT on 10/14/2020

## 2021-04-26 RX ORDER — NITROGLYCERIN 0.4 MG/1
TABLET SUBLINGUAL
Qty: 100 TABLET | Refills: 11 | Status: SHIPPED | OUTPATIENT
Start: 2021-04-26

## 2021-04-26 NOTE — TELEPHONE ENCOUNTER
Pt's wife called and stated that patient had one 5 minute episode of chest pain with nausea. They called EMS, had EKG, EMS stated that it didn't indicate he needed to go to hospital. Pt has had no chest pain, nausea, palpitations, SOB since.     Pt has appt 4/29/2021, nitro sent to pharmacy, pt aware how/when to take nitro.

## 2021-04-29 ENCOUNTER — OFFICE VISIT (OUTPATIENT)
Dept: CARDIOLOGY | Facility: CLINIC | Age: 81
End: 2021-04-29

## 2021-04-29 VITALS
HEART RATE: 69 BPM | SYSTOLIC BLOOD PRESSURE: 137 MMHG | BODY MASS INDEX: 27.7 KG/M2 | HEIGHT: 69 IN | DIASTOLIC BLOOD PRESSURE: 82 MMHG | TEMPERATURE: 96.4 F | WEIGHT: 187 LBS

## 2021-04-29 DIAGNOSIS — M25.472 ANKLE EDEMA, BILATERAL: ICD-10-CM

## 2021-04-29 DIAGNOSIS — Z72.0 SMOKELESS TOBACCO USE: ICD-10-CM

## 2021-04-29 DIAGNOSIS — M25.471 ANKLE EDEMA, BILATERAL: ICD-10-CM

## 2021-04-29 DIAGNOSIS — Z95.2 S/P AVR (AORTIC VALVE REPLACEMENT): ICD-10-CM

## 2021-04-29 DIAGNOSIS — I25.9 ISCHEMIC HEART DISEASE: Primary | ICD-10-CM

## 2021-04-29 DIAGNOSIS — E78.5 DYSLIPIDEMIA: ICD-10-CM

## 2021-04-29 DIAGNOSIS — I10 ESSENTIAL HYPERTENSION: ICD-10-CM

## 2021-04-29 PROCEDURE — 99214 OFFICE O/P EST MOD 30 MIN: CPT | Performed by: INTERNAL MEDICINE

## 2021-04-29 RX ORDER — FAMOTIDINE 10 MG
10 TABLET ORAL 2 TIMES DAILY PRN
COMMUNITY

## 2021-04-29 RX ORDER — LOSARTAN POTASSIUM 50 MG/1
50 TABLET ORAL DAILY
Qty: 30 TABLET | Refills: 11 | Status: SHIPPED | OUTPATIENT
Start: 2021-04-29 | End: 2021-07-06 | Stop reason: SDUPTHER

## 2021-04-29 RX ORDER — ISOSORBIDE MONONITRATE 30 MG/1
30 TABLET, EXTENDED RELEASE ORAL DAILY
Qty: 30 TABLET | Refills: 11 | Status: SHIPPED | OUTPATIENT
Start: 2021-04-29 | End: 2022-01-21

## 2021-04-29 RX ORDER — TAMSULOSIN HYDROCHLORIDE 0.4 MG/1
CAPSULE ORAL
COMMUNITY
Start: 2021-04-15 | End: 2021-09-01

## 2021-04-29 NOTE — PROGRESS NOTES
Subjective:     Encounter Date:04/29/2021    Patient ID: Sherron Boyle is a 80 y.o.  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     PHYSICIAN:  Sandy Corcoran MD  REMOTE INTERNIST: Jose Martin Munguia MD  CARDIOVASCULAR SURGEON: Kali Curry MD   UROLOGIST:  Jayce Delgado MD  SLEEP MD:  Khloe Caputo MD    Chief Complaint:   Chief Complaint   Patient presents with   • Ischemic heart disease       Problem List:  1. 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 Remote progressive NYHA class II-III exertional dyspnea/fatigue syndrome with CCS class II-III chest pain syndrome with abnormal EKG and abnormal echocardiogram with acceptable Cardiolite GXT. LVEF (0.58), July 2006.   b. Severe 2.5-vessel obstructive coronary artery disease with 3-vessel coronary disease and severe aortic stenosis with subsequent aortocoronary bypass graft placement x2 (RSVG PDA and RCA; RSVG distal OM nondominant LCx) with #21 Magna precordial aortic valve replacement and reduction aortic valve annuloplasty with possible small non ST elevation myocardial infarction and acceptable discharge echocardiographic study, August 2006.   c. Residual CCS class I anginal pectoris/NYHA class II exertional dyspnea/fatigue syndrome with acceptable echocardiogram. LVEF (0.62), December 2007.   d. BRENDA revealing grade II atheromatous disease of the transverse aorta and evidence of grade III atheroma in the descending aorta (atheroma less than or equal to 5 mm) with mild concentric LVH, normal sinus rhythm with negative IV saline contrast study and LVEF estimated at greater than 60%, 09/17/2009.  e. Acceptable echocardiographic GXT. LVEF (0.64) with nominal aortic valve function and mild left atrial enlargement with acceptable ascending, transverse, and proximal descending thoracic aorta, November  2009.  f. Remote dizziness/presyncope with transient chest pain and abnormal event recorder with acceptable combination Doppler echocardiogram and carotid duplex study as well as Cardiolite GXT, spring 2013, with residual class I symptoms.  g. Residual class I symptoms with acceptable stable echocardiogram. LVEF (0.72), December 2011; LVEF (0.65), November 2014.  h. Remote CCS class II chest pain/NYHA class II exertional dyspnea with acceptable limited echocardiographic GXT with exercise to 93% PMHR and only 75% predicted exercise capacity, January 2017.  i. Echocardiogram 2/27/2019: LV systolic function normal, mild TR, RVSP 25 mmHg, mild to moderate aortic stenosis, LV diastolic dysfunction grade 1 consistent with impaired relaxation, normal RV wall thickness, systolic function and septal motion noted with RV cavity mildly dilated, no pericardial effusion, no pulmonary hypertension, moderate MAC present, there is a bioprosthetic valve present with aortic peak and mean gradients elevated.  j. CCS class III [place]/NYHA class I dyspnea on exertion, April 2021  2. Chronic hypertension, probable essential.   3. Dyslipidemia; on statin therapy.  4. Remote tobacco use, with current smokeless tobacco use of 1 can/week.   5. Remote pneumonia, 1970.  6. Remote football injury with concussion, 1950.  7. Chronic lower tract obstructive symptoms, probable BPH.   8. Post-procedure weight gain and possible mild volume overload, October 2006.  9. Chronic hypothyroidism/replacement therapy.   10. Apparent bladder cancer with therapy and intermittent recurrent cystoscopy, autumn 2006.  11. Remote left podagra/possible gout attack -- data deficit, November 2008.  12. Erectile dysfunction.   13. Stage 3 chronic kidney disease.   14. Sleep apnea with abnormal outpatient screening study with subsequent apparent polysomnogram, data deficit (March 2016).  15. Ankle edema    Allergies   Allergen Reactions   • Lipitor [Atorvastatin] Myalgia      arthralgias/myalgias.   • Lisinopril Cough     paroxysmal cough.       Current Outpatient Medications:   •  aspirin 81 MG EC tablet, Take 81 mg by mouth Daily., Disp: , Rfl:   •  atorvastatin (LIPITOR) 40 MG tablet, Take 1 tablet by mouth Daily., Disp: , Rfl:   •  famotidine (PEPCID) 20 MG tablet, Take 20 mg by mouth Daily As Needed for Heartburn., Disp: , Rfl:   •  levothyroxine (SYNTHROID, LEVOTHROID) 25 MCG tablet, Take 25 mcg by mouth Daily., Disp: , Rfl:   •  losartan (COZAAR) 25 MG tablet, Take 25 mg by mouth Daily., Disp: , Rfl:   •  nitroglycerin (NITROSTAT) 0.4 MG SL tablet, 1 under the tongue as needed for angina, may repeat q5mins for up three doses, Disp: 100 tablet, Rfl: 11  •  tamsulosin (FLOMAX) 0.4 MG capsule 24 hr capsule, , Disp: , Rfl:   •  torsemide (DEMADEX) 5 MG tablet, Take 1 tablet by mouth Daily As Needed (increased edema)., Disp: 30 tablet, Rfl: 5    History of Present Illness: Patient returns early for follow up after a 6-month hiatus.  The patient has had both of his Moderna Covid vaccinations.  The patient had an episode the other night when he went to go use the restroom and when he was coming back to bed he developed chest pain with radiation to his left arm and neck/jaws with associated nausea and diaphoresis.  He denies any palpitations, presyncope, vomiting, or syncope.  He states that it was an ache that went away in around 5 minutes.  He called 911 and when EMS arrived his blood pressure was 152/80 and they did an EKG strip and told him he did not have a heart attack.  He did not have nitroglycerin sublingual available at that time, but he does now.  He has not had to use any nitroglycerin tablets since his episode of chest pain in the middle the night.  He brought his recent laboratory testing results with him today for us to review which were acceptable.  The patient gets antibiotics before he has dental cleanings/procedures in view of his previous AVR.  When he has checked  "his blood pressure at home it is normally around 130/70.  He still uses 1 can of smokeless tobacco per week.        Review of Systems   Cardiovascular: Positive for chest pain.      Obtained and negative except as outlined in problem list and HPI.    Procedures       Objective:       Vitals:    04/29/21 1038 04/29/21 1039   BP: 145/85 137/82   BP Location: Left arm Left arm   Patient Position: Sitting Standing   Pulse: 67 69   Temp: 96.4 °F (35.8 °C)    Weight: 84.8 kg (187 lb)    Height: 175.3 cm (69\")    Recheck blood pressure left arm sitting was 142/78  Body mass index is 27.62 kg/m².   Last weight: 184 lbs    Vitals reviewed.   Constitutional:       Appearance: Well-developed.   Neck:      Thyroid: No thyromegaly.      Vascular: No carotid bruit or JVD.      Lymphadenopathy: No cervical adenopathy.   Pulmonary:      Effort: Pulmonary effort is normal.      Breath sounds: Decreased breath sounds present. No wheezing. No rhonchi. No rales.   Cardiovascular:      Regular rhythm.      Murmurs: There is a grade 2/6 mid frequency harsh early systolic murmur at the URSB, LLSB and ULSB, radiating to the neck.      No gallop. No S3 gallop.   Pulses:     Dorsalis pedis: 2+ bilaterally.     Posterior tibial: 2+ bilaterally.  Edema:     Peripheral edema absent.   Abdominal:      Palpations: Abdomen is soft. There is no abdominal mass.      Tenderness: There is no abdominal tenderness. There is no guarding.   Musculoskeletal: Normal range of motion. Skin:     General: Skin is warm and dry.      Findings: No rash.   Neurological:      Mental Status: Alert and oriented to person, place, and time.           Lab Review:    04/15/2021:  · Lipid panel: Cholesterol 155, triglycerides 101, HDL 55, LDL 82  · TSH 3.21, T4-8.3  · CBC: WBC 5.1, RBC 5.1, hemoglobin 15.3, hematocrit 43.2, MCV 85, MCH 30, MCHC 35.4, RDW 13.3, platelets 191, neutrophils 68, monocytes 21, monocytes 9, eos 1, basos 1        Assessment:     Patient with " unstable angina CCS class III unstable angina/NYHA class I dyspnea on exertion symptoms.  We will add Imdur 30 mg daily and schedule the patient for a left heart catheterization +/-catheter-based intervention.  We will increase losartan to 50 mg daily.  The patient has nitroglycerin sublingual available if needed for recurrent chest pain in the interim.        Diagnosis Plan   1. Ischemic heart disease  LH +/-CBI, add Imdur 30 mg daily   2. Essential hypertension   Increase losartan to 50 mg daily   3. Dyslipidemia   Acceptable lipid panel April 2021, continue atorvastatin   4. S/P AVR (aortic valve replacement)  LH +/-CBI   5. Ankle edema, bilateral   Stable   6. Smokeless tobacco use   Encouraged tobacco cessation          Plan:         1. Patient to continue current medications and close follow up with the above providers the following medication changes:  A. Increase losartan to 50 mg daily  B. Initiate Imdur 30 mg daily  2. Tentative cardiology follow up in August 2021 or patient may return sooner PRN.   3. LHC +/-CBI; the procedure, risks, and potential complications discussed with him.    Scribed for Chandu Pereira MD by Clarisse Vernon, CINDY. 4/29/2021  12:01 EDT    I, Chandu Pereira MD, [facility], personally performed the services described in this documentation as scribed by the above named individual in my presence, and it is both accurate and complete. At 12:02 EDT on 04/29/2021

## 2021-05-04 ENCOUNTER — PREP FOR SURGERY (OUTPATIENT)
Dept: OTHER | Facility: HOSPITAL | Age: 81
End: 2021-05-04

## 2021-05-04 DIAGNOSIS — R07.2 PRECORDIAL PAIN: Primary | ICD-10-CM

## 2021-05-04 RX ORDER — SODIUM CHLORIDE 9 MG/ML
3 INJECTION, SOLUTION INTRAVENOUS CONTINUOUS
Status: CANCELLED | OUTPATIENT
Start: 2021-05-04 | End: 2021-05-04

## 2021-05-04 RX ORDER — SODIUM CHLORIDE 0.9 % (FLUSH) 0.9 %
10 SYRINGE (ML) INJECTION AS NEEDED
Status: CANCELLED | OUTPATIENT
Start: 2021-05-04

## 2021-05-04 RX ORDER — LIDOCAINE HYDROCHLORIDE 10 MG/ML
0.1 INJECTION, SOLUTION EPIDURAL; INFILTRATION; INTRACAUDAL; PERINEURAL ONCE AS NEEDED
Status: CANCELLED | OUTPATIENT
Start: 2021-05-04

## 2021-05-04 RX ORDER — SODIUM CHLORIDE 0.9 % (FLUSH) 0.9 %
3 SYRINGE (ML) INJECTION EVERY 12 HOURS SCHEDULED
Status: CANCELLED | OUTPATIENT
Start: 2021-05-04

## 2021-05-04 RX ORDER — ONDANSETRON 2 MG/ML
4 INJECTION INTRAMUSCULAR; INTRAVENOUS EVERY 6 HOURS PRN
Status: CANCELLED | OUTPATIENT
Start: 2021-05-04

## 2021-05-04 RX ORDER — NITROGLYCERIN 0.4 MG/1
0.4 TABLET SUBLINGUAL
Status: CANCELLED | OUTPATIENT
Start: 2021-05-04

## 2021-05-11 ENCOUNTER — APPOINTMENT (OUTPATIENT)
Dept: CARDIOLOGY | Facility: HOSPITAL | Age: 81
End: 2021-05-11

## 2021-05-11 ENCOUNTER — HOSPITAL ENCOUNTER (OUTPATIENT)
Facility: HOSPITAL | Age: 81
Discharge: HOME | End: 2021-05-11
Attending: INTERNAL MEDICINE | Admitting: INTERNAL MEDICINE

## 2021-05-11 VITALS
TEMPERATURE: 97.8 F | OXYGEN SATURATION: 95 % | HEIGHT: 69 IN | BODY MASS INDEX: 28.14 KG/M2 | WEIGHT: 190 LBS | RESPIRATION RATE: 16 BRPM | DIASTOLIC BLOOD PRESSURE: 79 MMHG | SYSTOLIC BLOOD PRESSURE: 118 MMHG | HEART RATE: 64 BPM

## 2021-05-11 DIAGNOSIS — R07.2 PRECORDIAL PAIN: ICD-10-CM

## 2021-05-11 DIAGNOSIS — I25.9 ISCHEMIC HEART DISEASE: ICD-10-CM

## 2021-05-11 LAB
ANION GAP SERPL CALCULATED.3IONS-SCNC: 9 MMOL/L (ref 5–15)
ASCENDING AORTA: 3.8 CM
BH CV ECHO MEAS - AO MAX PG (FULL): 39.9 MMHG
BH CV ECHO MEAS - AO MAX PG: 44.4 MMHG
BH CV ECHO MEAS - AO MEAN PG (FULL): 21 MMHG
BH CV ECHO MEAS - AO MEAN PG: 23 MMHG
BH CV ECHO MEAS - AO ROOT AREA (BSA CORRECTED): 1.7
BH CV ECHO MEAS - AO ROOT AREA: 9.6 CM^2
BH CV ECHO MEAS - AO ROOT DIAM: 3.5 CM
BH CV ECHO MEAS - AO V2 MAX: 333 CM/SEC
BH CV ECHO MEAS - AO V2 MEAN: 215 CM/SEC
BH CV ECHO MEAS - AO V2 VTI: 69.2 CM
BH CV ECHO MEAS - ASC AORTA: 3.8 CM
BH CV ECHO MEAS - AVA(I,A): 1 CM^2
BH CV ECHO MEAS - AVA(I,D): 1 CM^2
BH CV ECHO MEAS - AVA(V,A): 0.9 CM^2
BH CV ECHO MEAS - AVA(V,D): 0.9 CM^2
BH CV ECHO MEAS - BSA(HAYCOCK): 2.1 M^2
BH CV ECHO MEAS - BSA: 2 M^2
BH CV ECHO MEAS - BZI_BMI: 28.1 KILOGRAMS/M^2
BH CV ECHO MEAS - BZI_METRIC_HEIGHT: 175.3 CM
BH CV ECHO MEAS - BZI_METRIC_WEIGHT: 86.2 KG
BH CV ECHO MEAS - EDV(CUBED): 61.2 ML
BH CV ECHO MEAS - EDV(MOD-SP2): 37 ML
BH CV ECHO MEAS - EDV(MOD-SP4): 46 ML
BH CV ECHO MEAS - EDV(TEICH): 67.5 ML
BH CV ECHO MEAS - EF(CUBED): 81.6 %
BH CV ECHO MEAS - EF(MOD-BP): 71 %
BH CV ECHO MEAS - EF(MOD-SP2): 70.3 %
BH CV ECHO MEAS - EF(MOD-SP4): 71.7 %
BH CV ECHO MEAS - EF(TEICH): 74.9 %
BH CV ECHO MEAS - ESV(CUBED): 11.2 ML
BH CV ECHO MEAS - ESV(MOD-SP2): 11 ML
BH CV ECHO MEAS - ESV(MOD-SP4): 13 ML
BH CV ECHO MEAS - ESV(TEICH): 17 ML
BH CV ECHO MEAS - FS: 43.1 %
BH CV ECHO MEAS - IVS/LVPW: 1
BH CV ECHO MEAS - IVSD: 1.2 CM
BH CV ECHO MEAS - LA DIMENSION: 3.2 CM
BH CV ECHO MEAS - LA/AO: 0.91
BH CV ECHO MEAS - LAD MAJOR: 5.4 CM
BH CV ECHO MEAS - LAT PEAK E' VEL: 8.5 CM/SEC
BH CV ECHO MEAS - LATERAL E/E' RATIO: 8.8
BH CV ECHO MEAS - LV DIASTOLIC VOL/BSA (35-75): 22.8 ML/M^2
BH CV ECHO MEAS - LV IVRT: 0.11 SEC
BH CV ECHO MEAS - LV MASS(C)D: 163.8 GRAMS
BH CV ECHO MEAS - LV MASS(C)DI: 81 GRAMS/M^2
BH CV ECHO MEAS - LV MAX PG: 4.5 MMHG
BH CV ECHO MEAS - LV MEAN PG: 2 MMHG
BH CV ECHO MEAS - LV SYSTOLIC VOL/BSA (12-30): 6.4 ML/M^2
BH CV ECHO MEAS - LV V1 MAX: 106 CM/SEC
BH CV ECHO MEAS - LV V1 MEAN: 61.4 CM/SEC
BH CV ECHO MEAS - LV V1 VTI: 24.8 CM
BH CV ECHO MEAS - LVIDD: 3.9 CM
BH CV ECHO MEAS - LVIDS: 2.2 CM
BH CV ECHO MEAS - LVLD AP2: 6.7 CM
BH CV ECHO MEAS - LVLD AP4: 6.7 CM
BH CV ECHO MEAS - LVLS AP2: 5.4 CM
BH CV ECHO MEAS - LVLS AP4: 5.4 CM
BH CV ECHO MEAS - LVOT AREA (M): 2.8 CM^2
BH CV ECHO MEAS - LVOT AREA: 2.8 CM^2
BH CV ECHO MEAS - LVOT DIAM: 1.9 CM
BH CV ECHO MEAS - LVPWD: 1.2 CM
BH CV ECHO MEAS - MED PEAK E' VEL: 6.5 CM/SEC
BH CV ECHO MEAS - MEDIAL E/E' RATIO: 11.5
BH CV ECHO MEAS - MV A MAX VEL: 106 CM/SEC
BH CV ECHO MEAS - MV DEC SLOPE: 236 CM/SEC^2
BH CV ECHO MEAS - MV DEC TIME: 0.34 SEC
BH CV ECHO MEAS - MV E MAX VEL: 74.8 CM/SEC
BH CV ECHO MEAS - MV E/A: 0.71
BH CV ECHO MEAS - MV MAX PG: 4.8 MMHG
BH CV ECHO MEAS - MV MEAN PG: 2 MMHG
BH CV ECHO MEAS - MV P1/2T MAX VEL: 79.6 CM/SEC
BH CV ECHO MEAS - MV P1/2T: 98.8 MSEC
BH CV ECHO MEAS - MV V2 MAX: 109 CM/SEC
BH CV ECHO MEAS - MV V2 MEAN: 57.3 CM/SEC
BH CV ECHO MEAS - MV V2 VTI: 29.6 CM
BH CV ECHO MEAS - MVA P1/2T LCG: 2.8 CM^2
BH CV ECHO MEAS - MVA(P1/2T): 2.2 CM^2
BH CV ECHO MEAS - MVA(VTI): 2.4 CM^2
BH CV ECHO MEAS - PA ACC SLOPE: 381 CM/SEC^2
BH CV ECHO MEAS - PA ACC TIME: 0.13 SEC
BH CV ECHO MEAS - PA MAX PG: 4.7 MMHG
BH CV ECHO MEAS - PA PR(ACCEL): 20.5 MMHG
BH CV ECHO MEAS - PA V2 MAX: 108 CM/SEC
BH CV ECHO MEAS - RAP SYSTOLE: 3 MMHG
BH CV ECHO MEAS - RVSP: 19 MMHG
BH CV ECHO MEAS - SI(AO): 329.4 ML/M^2
BH CV ECHO MEAS - SI(CUBED): 24.7 ML/M^2
BH CV ECHO MEAS - SI(LVOT): 34.8 ML/M^2
BH CV ECHO MEAS - SI(MOD-SP2): 12.9 ML/M^2
BH CV ECHO MEAS - SI(MOD-SP4): 16.3 ML/M^2
BH CV ECHO MEAS - SI(TEICH): 25 ML/M^2
BH CV ECHO MEAS - SV(AO): 665.8 ML
BH CV ECHO MEAS - SV(CUBED): 49.9 ML
BH CV ECHO MEAS - SV(LVOT): 70.3 ML
BH CV ECHO MEAS - SV(MOD-SP2): 26 ML
BH CV ECHO MEAS - SV(MOD-SP4): 33 ML
BH CV ECHO MEAS - SV(TEICH): 50.6 ML
BH CV ECHO MEAS - TAPSE (>1.6): 2.1 CM
BH CV ECHO MEAS - TR MAX PG: 16 MMHG
BH CV ECHO MEAS - TR MAX VEL: 203 CM/SEC
BH CV ECHO MEASUREMENTS AVERAGE E/E' RATIO: 9.97
BH CV VAS BP LEFT ARM: NORMAL MMHG
BH CV XLRA - RV BASE: 4.1 CM
BH CV XLRA - RV LENGTH: 6.8 CM
BH CV XLRA - RV MID: 3.4 CM
BH CV XLRA - TDI S': 10.8 CM/SEC
BUN SERPL-MCNC: 22 MG/DL (ref 8–23)
BUN/CREAT SERPL: 15.9 (ref 7–25)
CALCIUM SPEC-SCNC: 9 MG/DL (ref 8.6–10.5)
CHLORIDE SERPL-SCNC: 105 MMOL/L (ref 98–107)
CO2 SERPL-SCNC: 27 MMOL/L (ref 22–29)
CREAT BLDA-MCNC: 1.5 MG/DL (ref 0.6–1.3)
CREAT SERPL-MCNC: 1.38 MG/DL (ref 0.76–1.27)
DEPRECATED RDW RBC AUTO: 42 FL (ref 37–54)
ERYTHROCYTE [DISTWIDTH] IN BLOOD BY AUTOMATED COUNT: 13.2 % (ref 12.3–15.4)
GFR SERPL CREATININE-BSD FRML MDRD: 50 ML/MIN/1.73
GLUCOSE SERPL-MCNC: 98 MG/DL (ref 65–99)
HCT VFR BLD AUTO: 43.1 % (ref 37.5–51)
HGB BLD-MCNC: 14.5 G/DL (ref 13–17.7)
IVRT: 106 MSEC
LEFT ATRIUM VOLUME INDEX: 21.3 ML/M^2
LEFT ATRIUM VOLUME: 43 ML
MAXIMAL PREDICTED HEART RATE: 142 BPM
MCH RBC QN AUTO: 29.4 PG (ref 26.6–33)
MCHC RBC AUTO-ENTMCNC: 33.6 G/DL (ref 31.5–35.7)
MCV RBC AUTO: 87.4 FL (ref 79–97)
PLATELET # BLD AUTO: 206 10*3/MM3 (ref 140–450)
PMV BLD AUTO: 9.3 FL (ref 6–12)
POTASSIUM SERPL-SCNC: 4.2 MMOL/L (ref 3.5–5.2)
RBC # BLD AUTO: 4.93 10*6/MM3 (ref 4.14–5.8)
SODIUM SERPL-SCNC: 141 MMOL/L (ref 136–145)
STRESS TARGET HR: 121 BPM
WBC # BLD AUTO: 6.72 10*3/MM3 (ref 3.4–10.8)

## 2021-05-11 PROCEDURE — 82565 ASSAY OF CREATININE: CPT

## 2021-05-11 PROCEDURE — 93306 TTE W/DOPPLER COMPLETE: CPT

## 2021-05-11 PROCEDURE — 25010000002 HEPARIN (PORCINE) PER 1000 UNITS: Performed by: INTERNAL MEDICINE

## 2021-05-11 PROCEDURE — 25010000002 MIDAZOLAM PER 1 MG: Performed by: INTERNAL MEDICINE

## 2021-05-11 PROCEDURE — 93459 L HRT ART/GRFT ANGIO: CPT | Performed by: INTERNAL MEDICINE

## 2021-05-11 PROCEDURE — C1769 GUIDE WIRE: HCPCS | Performed by: INTERNAL MEDICINE

## 2021-05-11 PROCEDURE — 80048 BASIC METABOLIC PNL TOTAL CA: CPT | Performed by: NURSE PRACTITIONER

## 2021-05-11 PROCEDURE — 93306 TTE W/DOPPLER COMPLETE: CPT | Performed by: INTERNAL MEDICINE

## 2021-05-11 PROCEDURE — 93458 L HRT ARTERY/VENTRICLE ANGIO: CPT | Performed by: INTERNAL MEDICINE

## 2021-05-11 PROCEDURE — 0 IOPAMIDOL PER 1 ML: Performed by: INTERNAL MEDICINE

## 2021-05-11 PROCEDURE — C1894 INTRO/SHEATH, NON-LASER: HCPCS | Performed by: INTERNAL MEDICINE

## 2021-05-11 PROCEDURE — 25010000002 FENTANYL CITRATE (PF) 100 MCG/2ML SOLUTION: Performed by: INTERNAL MEDICINE

## 2021-05-11 PROCEDURE — 85027 COMPLETE CBC AUTOMATED: CPT | Performed by: NURSE PRACTITIONER

## 2021-05-11 RX ORDER — SODIUM CHLORIDE 0.9 % (FLUSH) 0.9 %
3 SYRINGE (ML) INJECTION EVERY 12 HOURS SCHEDULED
Status: DISCONTINUED | OUTPATIENT
Start: 2021-05-11 | End: 2021-05-11 | Stop reason: HOSPADM

## 2021-05-11 RX ORDER — LIDOCAINE HYDROCHLORIDE 10 MG/ML
INJECTION, SOLUTION EPIDURAL; INFILTRATION; INTRACAUDAL; PERINEURAL AS NEEDED
Status: DISCONTINUED | OUTPATIENT
Start: 2021-05-11 | End: 2021-05-11 | Stop reason: HOSPADM

## 2021-05-11 RX ORDER — ONDANSETRON 2 MG/ML
4 INJECTION INTRAMUSCULAR; INTRAVENOUS EVERY 6 HOURS PRN
Status: DISCONTINUED | OUTPATIENT
Start: 2021-05-11 | End: 2021-05-11 | Stop reason: HOSPADM

## 2021-05-11 RX ORDER — FENTANYL CITRATE 50 UG/ML
INJECTION, SOLUTION INTRAMUSCULAR; INTRAVENOUS AS NEEDED
Status: DISCONTINUED | OUTPATIENT
Start: 2021-05-11 | End: 2021-05-11 | Stop reason: HOSPADM

## 2021-05-11 RX ORDER — LIDOCAINE HYDROCHLORIDE 10 MG/ML
0.1 INJECTION, SOLUTION EPIDURAL; INFILTRATION; INTRACAUDAL; PERINEURAL ONCE AS NEEDED
Status: DISCONTINUED | OUTPATIENT
Start: 2021-05-11 | End: 2021-05-11 | Stop reason: HOSPADM

## 2021-05-11 RX ORDER — MIDAZOLAM HYDROCHLORIDE 1 MG/ML
INJECTION INTRAMUSCULAR; INTRAVENOUS AS NEEDED
Status: DISCONTINUED | OUTPATIENT
Start: 2021-05-11 | End: 2021-05-11 | Stop reason: HOSPADM

## 2021-05-11 RX ORDER — NITROGLYCERIN 0.4 MG/1
0.4 TABLET SUBLINGUAL
Status: DISCONTINUED | OUTPATIENT
Start: 2021-05-11 | End: 2021-05-11 | Stop reason: HOSPADM

## 2021-05-11 RX ORDER — SODIUM CHLORIDE 9 MG/ML
1 INJECTION, SOLUTION INTRAVENOUS CONTINUOUS
Status: ACTIVE | OUTPATIENT
Start: 2021-05-11 | End: 2021-05-11

## 2021-05-11 RX ORDER — SODIUM CHLORIDE 9 MG/ML
3 INJECTION, SOLUTION INTRAVENOUS CONTINUOUS
Status: ACTIVE | OUTPATIENT
Start: 2021-05-11 | End: 2021-05-11

## 2021-05-11 RX ORDER — SODIUM CHLORIDE 0.9 % (FLUSH) 0.9 %
10 SYRINGE (ML) INJECTION AS NEEDED
Status: DISCONTINUED | OUTPATIENT
Start: 2021-05-11 | End: 2021-05-11 | Stop reason: HOSPADM

## 2021-05-11 RX ADMIN — SODIUM CHLORIDE 3 ML/KG/HR: 9 INJECTION, SOLUTION INTRAVENOUS at 06:40

## 2021-06-24 ENCOUNTER — TELEPHONE (OUTPATIENT)
Dept: CARDIOLOGY | Facility: CLINIC | Age: 81
End: 2021-06-24

## 2021-06-24 NOTE — TELEPHONE ENCOUNTER
Noted; moderate cardiovascular risk and he will need careful monitoring of volume status and assess for cardiac arrhythmias in view of known moderate aortic stenosis.    Thanks!

## 2021-06-24 NOTE — TELEPHONE ENCOUNTER
Tania from Dr. Naldo Cheney called and stated that patient needs to have robotic radical cystectomy prostatectomy with pelvic lymph node dissection and ileal conduit.     Pt's LOV 4/29/21, LHC and ECHO 5/11/21.    What is patient's cardiac risk assessment?     Please advise.          P# [phone]  F# [phone]

## 2021-07-07 RX ORDER — LOSARTAN POTASSIUM 50 MG/1
50 TABLET ORAL DAILY
Qty: 90 TABLET | Refills: 3 | Status: SHIPPED | OUTPATIENT
Start: 2021-07-07 | End: 2022-11-01

## 2021-09-01 ENCOUNTER — OFFICE VISIT (OUTPATIENT)
Dept: CARDIOLOGY | Facility: CLINIC | Age: 81
End: 2021-09-01

## 2021-09-01 VITALS
BODY MASS INDEX: 26.13 KG/M2 | WEIGHT: 176.4 LBS | OXYGEN SATURATION: 96 % | HEIGHT: 69 IN | DIASTOLIC BLOOD PRESSURE: 87 MMHG | HEART RATE: 70 BPM | SYSTOLIC BLOOD PRESSURE: 142 MMHG

## 2021-09-01 DIAGNOSIS — E78.5 DYSLIPIDEMIA: ICD-10-CM

## 2021-09-01 DIAGNOSIS — I10 ESSENTIAL HYPERTENSION: ICD-10-CM

## 2021-09-01 DIAGNOSIS — I25.9 ISCHEMIC HEART DISEASE: Primary | ICD-10-CM

## 2021-09-01 DIAGNOSIS — M25.472 ANKLE EDEMA, BILATERAL: ICD-10-CM

## 2021-09-01 DIAGNOSIS — M25.471 ANKLE EDEMA, BILATERAL: ICD-10-CM

## 2021-09-01 DIAGNOSIS — Z95.2 S/P AVR (AORTIC VALVE REPLACEMENT): ICD-10-CM

## 2021-09-01 DIAGNOSIS — Z72.0 SMOKELESS TOBACCO USE: ICD-10-CM

## 2021-09-01 PROCEDURE — 93000 ELECTROCARDIOGRAM COMPLETE: CPT | Performed by: INTERNAL MEDICINE

## 2021-09-01 PROCEDURE — 99214 OFFICE O/P EST MOD 30 MIN: CPT | Performed by: INTERNAL MEDICINE

## 2021-09-01 RX ORDER — HYDROCODONE BITARTRATE AND ACETAMINOPHEN 7.5; 325 MG/1; MG/1
1 TABLET ORAL AS NEEDED
COMMUNITY
Start: 2021-07-30 | End: 2022-01-21

## 2021-09-01 RX ORDER — AMOXICILLIN AND CLAVULANATE POTASSIUM 875; 125 MG/1; MG/1
1 TABLET, FILM COATED ORAL 2 TIMES DAILY
COMMUNITY
Start: 2021-08-30 | End: 2022-01-21

## 2021-09-01 NOTE — PROGRESS NOTES
Subjective:     Encounter Date:09/01/2021    Patient ID: Sherron Boyle is a 81 y.o.  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     PHYSICIAN:  Sandy Corcoran MD  REMOTE INTERNIST: Jose Martin Munguia MD  CARDIOVASCULAR SURGEON: Kali Curry MD   UROLOGIST:  Jayce Delgado MD  SLEEP MD:  Khloe Caputo MD  ANGIOGRAPHER: Cody Vallecillo MD  UROLOGIC SURGEON: Naldo Cheney MD  RADIATION ONCOLOGIST: Lanre Akins MD ([place])  ONCOLOGIST/HEMATOLOGIST:  Viviana Cantrell MD ([place])    Chief Complaint:   Chief Complaint   Patient presents with   • Ischemic heart disease       Problem List:  1. 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 Remote progressive NYHA class II-III exertional dyspnea/fatigue syndrome with CCS class II-III chest pain syndrome with abnormal EKG and abnormal echocardiogram with acceptable Cardiolite GXT. LVEF (0.58), July 2006.   b. Severe 2.5-vessel obstructive coronary artery disease with 3-vessel coronary disease and severe aortic stenosis with subsequent aortocoronary bypass graft placement x2 (RSVG PDA and RCA; RSVG distal OM nondominant LCx) with #21 Magna precordial aortic valve replacement and reduction aortic valve annuloplasty with possible small non ST elevation myocardial infarction and acceptable discharge echocardiographic study, August 2006.   c. Residual CCS class I anginal pectoris/NYHA class II exertional dyspnea/fatigue syndrome with acceptable echocardiogram. LVEF (0.62), December 2007.   d. BRENDA revealing grade II atheromatous disease of the transverse aorta and evidence of grade III atheroma in the descending aorta (atheroma less than or equal to 5 mm) with mild concentric LVH, normal sinus rhythm with negative IV saline contrast study and LVEF estimated at greater than 60%,  09/17/2009.  e. Acceptable echocardiographic GXT. LVEF (0.64) with nominal aortic valve function and mild left atrial enlargement with acceptable ascending, transverse, and proximal descending thoracic aorta, November 2009.  f. Remote dizziness/presyncope with transient chest pain and abnormal event recorder with acceptable combination Doppler echocardiogram and carotid duplex study as well as Cardiolite GXT, spring 2013, with residual class I symptoms.  g. Residual class I symptoms with acceptable stable echocardiogram. LVEF (0.72), December 2011; LVEF (0.65), November 2014.  h. Remote CCS class II chest pain/NYHA class II exertional dyspnea with acceptable limited echocardiographic GXT with exercise to 93% PMHR and only 75% predicted exercise capacity, January 2017.  i. Echocardiogram 2/27/2019: LV systolic function normal, mild TR, RVSP 25 mmHg, mild to moderate aortic stenosis, LV diastolic dysfunction grade 1 consistent with impaired relaxation, normal RV wall thickness, systolic function and septal motion noted with RV cavity mildly dilated, no pericardial effusion, no pulmonary hypertension, moderate MAC present, there is a bioprosthetic valve present with aortic peak and mean gradients elevated.  j. CCS class III USA/NYHA class I dyspnea on exertion, April 2021  k. Echocardiogram, May 2021: LVEF 0.66-0.70. Grade I LV diastolic dysfunction. Mild concentric LVH. Trivial transvalvular regurgitation of the bioprosthetic aortic valve. Mild stenosis or obstruction of the prosthetic aortic valve. Mean gradient 23 mmHg.  l. [facility], May 2021: Patent SVG-RCA and SVG-OM, LAD with minimal nonobstructive disease. Moderate to severe bioprosthetic aortic valve gradient. LVEDP 25 mmHg.  m. Residual class I symptoms, September 2021  2. Chronic hypertension, probable essential.   3. Dyslipidemia; on statin therapy.  4. Remote tobacco use, with current smokeless tobacco use of 1 can/week.   5. Remote pneumonia, 1970.  6. Remote  football injury with concussion, 1950.  7. Chronic lower tract obstructive symptoms, probable BPH.   8. Post-procedure weight gain and possible mild volume overload, October 2006.  9. Chronic hypothyroidism/replacement therapy.   10. Apparent bladder cancer with therapy and intermittent recurrent cystoscopy, autumn 2006 with subsequent cystectomy and positive lymph node with diversion of ureters- data deficit, June 2021 SJE with subsequent referral to [facility] for treatment.  11. Remote left podagra/possible gout attack -- data deficit, November 2008.  12. Erectile dysfunction.   13. Stage 3 chronic kidney disease.   14. Sleep apnea with abnormal outpatient screening study with subsequent apparent polysomnogram, data deficit (March 2016).  15. Ankle edema      Allergies   Allergen Reactions   • Lipitor [Atorvastatin] Myalgia     arthralgias/myalgias.   • Lisinopril Cough     paroxysmal cough.       Current Outpatient Medications:   •  amoxicillin-clavulanate (AUGMENTIN) 875-125 MG per tablet, Take 1 tablet by mouth 2 (two) times a day., Disp: , Rfl:   •  aspirin 81 MG EC tablet, Take 81 mg by mouth Daily., Disp: , Rfl:   •  atorvastatin (LIPITOR) 40 MG tablet, Take 1 tablet by mouth Daily., Disp: , Rfl:   •  famotidine (PEPCID) 10 MG tablet, Take 10 mg by mouth 2 (Two) Times a Day As Needed for Heartburn., Disp: , Rfl:   •  HYDROcodone-acetaminophen (NORCO) 7.5-325 MG per tablet, Take 1 tablet by mouth As Needed., Disp: , Rfl:   •  isosorbide mononitrate (IMDUR) 30 MG 24 hr tablet, Take 1 tablet by mouth Daily., Disp: 30 tablet, Rfl: 11  •  levothyroxine (SYNTHROID, LEVOTHROID) 25 MCG tablet, Take 25 mcg by mouth Daily., Disp: , Rfl:   •  losartan (COZAAR) 50 MG tablet, Take 1 tablet by mouth Daily. (Patient taking differently: Take 50 mg by mouth Daily. 0.5 tab daily), Disp: 90 tablet, Rfl: 3  •  nitroglycerin (NITROSTAT) 0.4 MG SL tablet, 1 under the tongue as needed for angina, may  repeat q5mins for up three doses, Disp: 100 tablet, Rfl: 11  •  torsemide (DEMADEX) 5 MG tablet, Take 1 tablet by mouth Daily As Needed (increased edema)., Disp: 30 tablet, Rfl: 5       History of Present Illness: Patient returns for scheduled 4-month follow up. He had an echocardiogram, May 2021 which demonstrated LVEF 0.66-0.70. Grade I LV diastolic dysfunction. Mild concentric LVH. Trivial transvalvular regurgitation of the bioprosthetic aortic valve. Mild stenosis or obstruction of the prosthetic aortic valve. Mean gradient 23 mmHg.He also had a  LHC, May 2021 showing patent SVG-RCA and SVG-OM, LAD with minimal nonobstructive disease. Moderate to severe bioprosthetic aortic valve gradient. LVEDP 25 mmHg.  His left radial artery cath site is healed. He reports in June 2021 he underwent prostatectomy and bladder removal per Dr. Cheney at [place]. He now has a bladder bag in place. He is scheduled for PET scan with plan for radiation as well as immunotherapy in the near future at [facility]; data deficit. He reports he has otherwise felt well overall from a cardiovascular standpoint. Patient denies chest pain, shortness of breath, palpitations, edema, dizziness, and syncope. He reports he is fairly active and does not have cardiopulmonary limitations with activity. He has had no additional interim ER visits, hospitalizations, serious illnesses, or surgeries. He has decreased his losartan to 25 mg daily and notes his Imdur causes frequent headaches. He is accompanied to the office today by his wife. He has received COVID immunization and will receive his booster today. He is scheduled to vacation in [place], [place] and will be sheltered at a [facility]. He does not have any cardiopulmonary complaints with activities currently.      ROS   Obtained and negative except as outlined in problem list and HPI.      ECG 12 Lead    Date/Time: 9/1/2021 3:00 PM  Performed by: Chandu Pereira MD  Authorized by:  "Chandu Pereira MD   Comparison: not compared with previous ECG   Previous ECG: no previous ECG available  Rhythm: sinus rhythm  BPM: 64    Clinical impression: normal ECG  Comments: QRS: 76 ms  QTc: 445 ms  CO: 192 ms               Objective:       Vitals:    09/01/21 1433 09/01/21 1436   BP: 125/79 142/87   BP Location: Right arm Right arm   Patient Position: Sitting Standing   Pulse: 66 70   SpO2: 96%    Weight: 80 kg (176 lb 6.4 oz)    Height: 175.3 cm (69\")      Body mass index is 26.05 kg/m².  Last weight: 187 lbs     Vitals reviewed.   Constitutional:       Appearance: Well-developed.   Neck:      Thyroid: No thyromegaly.      Vascular: No carotid bruit or JVD.      Lymphadenopathy: No cervical adenopathy.   Pulmonary:      Effort: Pulmonary effort is normal.      Breath sounds: Normal breath sounds. No wheezing. No rhonchi. No rales.   Cardiovascular:      Regular rhythm.      Murmurs: There is a grade 2/6 mid frequency harsh early systolic murmur at the URSB, LLSB and ULSB, radiating to the neck.      No gallop. No S3 gallop.   Pulses:     Dorsalis pedis: 1+ bilaterally.     Posterior tibial: 1+ bilaterally.  Edema:     Peripheral edema absent.   Abdominal:      Palpations: Abdomen is soft. There is no abdominal mass.      Tenderness: There is no abdominal tenderness.   Musculoskeletal: Normal range of motion. Skin:     General: Skin is warm and dry.      Findings: No rash.   Neurological:      Mental Status: Alert and oriented to person, place, and time.           Lab Review:     4/15/2021 (reviewed with patient by letter - continue current treatment):  • CBC: hematocrit 43%, hemoglobin 15.3, WBC 5100 and normal indices, platelet count and differential  • FLP: total cholesterol 155, triglycerides 101, HDL-C 55, LDL-C 82    Lab Results   Component Value Date    GLUCOSE 98 05/11/2021    BUN 22 05/11/2021    CREATININE 1.38 (H) 05/11/2021    CREATININE 1.50 (H) 05/11/2021    EGFRIFNONA 50 (L) 05/11/2021    " BCR 15.9 05/11/2021     05/11/2021    K 4.2 05/11/2021     05/11/2021    CO2 27.0 05/11/2021    CALCIUM 9.0 05/11/2021       Lab Results   Component Value Date    WBC 6.72 05/11/2021    HGB 14.5 05/11/2021    HCT 43.1 05/11/2021    MCV 87.4 05/11/2021     05/11/2021     Echocardiogram, 5/11/2021 (reviewed with patient by letter - continue current treatment):  · Left ventricular ejection fraction appears to be 66 - 70%. Left ventricular systolic function is normal.  · Left ventricular diastolic function is consistent with (grade I) impaired relaxation.  · Left ventricular wall thickness is consistent with mild concentric hypertrophy.  · There is a bioprosthetic aortic valve present. Trivial transvalvular regurgitation is present in the prosthetic aortic valve.  · Estimated right ventricular systolic pressure from tricuspid regurgitation is normal (<35 mmHg).  · There is mild stenosis or obstruction of the prosthetic aortic valve. Mean gradient 23 mmHg.    LHC, 5/11/2021:  · Patent SVG-RCA and SVG-OM, LAD with minimal nonobstructive disease  · Moderate to severe bioprosthetic aortic valve gradient  · LVEDP elevated at 25 mmHg.        Assessment:       Overall continued acceptable course with no new interim cardiopulmonary complaints with acceptable functional status. We will defer additional diagnostic or therapeutic intervention from a cardiac perspective at this time.He had an echocardiogram, May 2021 which demonstrated LVEF 0.66-0.70. Grade I LV diastolic dysfunction. Mild concentric LVH. Trivial transvalvular regurgitation of the bioprosthetic aortic valve. Mild stenosis or obstruction of the prosthetic aortic valve. Mean gradient 23 mmHg.He also had a  LHC, May 2021 showing patent SVG-RCA and SVG-OM, LAD with minimal nonobstructive disease. Moderate to severe bioprosthetic aortic valve gradient. LVEDP 25 mmHg. Patient is to notify us if they develop any symptoms of unusual fatigue, dyspnea, or  symptoms of chest pressure. There is a discrepancy between his echo and catheterization aortic valve gradients but his exam is stable and he is currently asymptomatic. Defer workup at this time for consideration of TAVR. He has no oral issues.     Diagnosis Plan   1. Ischemic heart disease  No recurrent angina pectoris or CHF on current activity schedule; continue current treatment   2. S/P AVR (aortic valve replacement)  Stable examination.   3. Essential hypertension  Well controlled. Continue current treatment.   4. Dyslipidemia  No data to review. Continue atorvastatin.   5. Ankle edema, bilateral  Stable and asymptomatic.   6. Smokeless tobacco use  Cessation strongly encouraged.          Plan:         1. Patient to continue current medications and close follow up with the above providers.  2. Patient may discontinue Imdur due to frequent headaches.  3. Tentative cardiology follow up in January 2022 with consideration of transthoracic echocardiogram in spring 2022 or patient may return sooner PRN.    I, Hima Onofre, attest that this documentation has been prepared under the direction and in the presence of Chandu Pereira MD 09/01/2021    I, Chandu Pereira MD, [facility], personally performed the services described in this documentation as scribed by the above named individual in my presence, and it is both accurate and complete. At 15:08 EDT on 09/01/2021

## 2022-01-21 ENCOUNTER — OFFICE VISIT (OUTPATIENT)
Dept: CARDIOLOGY | Facility: CLINIC | Age: 82
End: 2022-01-21

## 2022-01-21 VITALS
DIASTOLIC BLOOD PRESSURE: 76 MMHG | HEART RATE: 68 BPM | SYSTOLIC BLOOD PRESSURE: 138 MMHG | WEIGHT: 177.2 LBS | OXYGEN SATURATION: 98 % | HEIGHT: 70 IN | BODY MASS INDEX: 25.37 KG/M2

## 2022-01-21 DIAGNOSIS — Z72.0 SMOKELESS TOBACCO USE: ICD-10-CM

## 2022-01-21 DIAGNOSIS — I25.9 ISCHEMIC HEART DISEASE: Primary | ICD-10-CM

## 2022-01-21 DIAGNOSIS — M25.471 ANKLE EDEMA, BILATERAL: ICD-10-CM

## 2022-01-21 DIAGNOSIS — I10 ESSENTIAL HYPERTENSION: ICD-10-CM

## 2022-01-21 DIAGNOSIS — Z95.2 S/P AVR (AORTIC VALVE REPLACEMENT): ICD-10-CM

## 2022-01-21 DIAGNOSIS — E78.5 DYSLIPIDEMIA: ICD-10-CM

## 2022-01-21 DIAGNOSIS — M25.472 ANKLE EDEMA, BILATERAL: ICD-10-CM

## 2022-01-21 PROCEDURE — 99214 OFFICE O/P EST MOD 30 MIN: CPT | Performed by: INTERNAL MEDICINE

## 2022-01-21 RX ORDER — OSTOMY SUPPLY 1 3/4"
EACH MISCELLANEOUS
COMMUNITY
Start: 2022-01-18

## 2022-01-21 RX ORDER — URINARY BAG
EACH MISCELLANEOUS SEE ADMIN INSTRUCTIONS
COMMUNITY
Start: 2022-01-18

## 2022-01-21 NOTE — PROGRESS NOTES
Subjective:     Encounter Date:01/21/2022    Patient ID: Sherron Boyle is a 81 y.o.  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     PHYSICIAN:  Sandy Corcoran MD  REMOTE INTERNIST: Jose Martin Munguia MD  CARDIOVASCULAR SURGEON: Kali Curry MD   UROLOGIST:  Jayce Delgado MD  SLEEP MD:  Khloe Caputo MD  ANGIOGRAPHER: Cody Vallecillo MD  UROLOGIC SURGEON: Naldo Cheney MD  RADIATION ONCOLOGIST: Lanre Akins MD ([place])  ONCOLOGIST/HEMATOLOGIST:  Viviana Cantrell MD ([place])    Chief Complaint:   Chief Complaint   Patient presents with   • Ischemic heart disease       Problem List:  1. 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 Remote progressive NYHA class II-III exertional dyspnea/fatigue syndrome with CCS class II-III chest pain syndrome with abnormal EKG and abnormal echocardiogram with acceptable Cardiolite GXT. LVEF (0.58), July 2006.   b. Severe 2.5-vessel obstructive coronary artery disease with 3-vessel coronary disease and severe aortic stenosis with subsequent aortocoronary bypass graft placement x2 (RSVG PDA and RCA; RSVG distal OM nondominant LCx) with #21 Magna precordial aortic valve replacement and reduction aortic valve annuloplasty with possible small non ST elevation myocardial infarction and acceptable discharge echocardiographic study, August 2006.   c. Residual CCS class I anginal pectoris/NYHA class II exertional dyspnea/fatigue syndrome with acceptable echocardiogram. LVEF (0.62), December 2007.   d. BRENDA revealing grade II atheromatous disease of the transverse aorta and evidence of grade III atheroma in the descending aorta (atheroma less than or equal to 5 mm) with mild concentric LVH, normal sinus rhythm with negative IV saline contrast study and LVEF estimated at greater than 60%,  09/17/2009.  e. Acceptable echocardiographic GXT. LVEF (0.64) with nominal aortic valve function and mild left atrial enlargement with acceptable ascending, transverse, and proximal descending thoracic aorta, November 2009.  f. Remote dizziness/presyncope with transient chest pain and abnormal event recorder with acceptable combination Doppler echocardiogram and carotid duplex study as well as Cardiolite GXT, spring 2013, with residual class I symptoms.  g. Residual class I symptoms with acceptable stable echocardiogram. LVEF (0.72), December 2011; LVEF (0.65), November 2014.  h. Remote CCS class II chest pain/NYHA class II exertional dyspnea with acceptable limited echocardiographic GXT with exercise to 93% PMHR and only 75% predicted exercise capacity, January 2017.  i. Echocardiogram 2/27/2019: LV systolic function normal, mild TR, RVSP 25 mmHg, mild to moderate aortic stenosis, LV diastolic dysfunction grade 1 consistent with impaired relaxation, normal RV wall thickness, systolic function and septal motion noted with RV cavity mildly dilated, no pericardial effusion, no pulmonary hypertension, moderate MAC present, there is a bioprosthetic valve present with aortic peak and mean gradients elevated.  j. CCS class III USA/NYHA class I dyspnea on exertion, April 2021  k. Echocardiogram, May 2021: LVEF 0.66-0.70. Grade I LV diastolic dysfunction. Mild concentric LVH. Trivial transvalvular regurgitation of the bioprosthetic aortic valve. Mild stenosis or obstruction of the prosthetic aortic valve. Mean gradient 23 mmHg.  l. [facility], May 2021: Patent SVG-RCA and SVG-OM, LAD with minimal nonobstructive disease. Moderate to severe bioprosthetic aortic valve gradient. LVEDP 25 mmHg.  m. Residual class I symptoms, September 2021, January 2022.  2. Chronic hypertension, probable essential.   3. Dyslipidemia; on statin therapy.  4. Remote tobacco use, with current smokeless tobacco use of 1 can/week.   5. Remote pneumonia,  1970.  6. Remote football injury with concussion, 1950.  7. Chronic lower tract obstructive symptoms, probable BPH.   8. Post-procedure weight gain and possible mild volume overload, October 2006.  9. Chronic hypothyroidism/replacement therapy.   10. Apparent bladder cancer with therapy and intermittent recurrent cystoscopy, autumn 2006 with subsequent cystectomy and positive lymph node with diversion of ureters- data deficit, June 2021 SJE with subsequent referral to [facility] for treatment followed by 21 radiation treatments and subsequent initiation of immunotherapy.  11. Remote left podagra/possible gout attack -- data deficit, November 2008.  12. Erectile dysfunction.   13. Stage 3 chronic kidney disease.   14. Sleep apnea with abnormal outpatient screening study with subsequent apparent polysomnogram, data deficit (March 2016).  15. Ankle edema    Allergies   Allergen Reactions   • Lipitor [Atorvastatin] Myalgia     arthralgias/myalgias.   • Lisinopril Cough     paroxysmal cough.       Current Outpatient Medications:   •  aspirin 81 MG EC tablet, Take 81 mg by mouth Daily., Disp: , Rfl:   •  atorvastatin (LIPITOR) 40 MG tablet, Take 1 tablet by mouth Daily., Disp: , Rfl:   •  famotidine (PEPCID) 10 MG tablet, Take 10 mg by mouth 2 (Two) Times a Day As Needed for Heartburn., Disp: , Rfl:   •  Incontinence Supplies ([place] [employer] Close System) misc, See Admin Instructions., Disp: , Rfl:   •  levothyroxine (SYNTHROID, LEVOTHROID) 25 MCG tablet, Take 25 mcg by mouth Daily., Disp: , Rfl:   •  losartan (COZAAR) 50 MG tablet, Take 1 tablet by mouth Daily. (Patient taking differently: Take 50 mg by mouth Daily. 0.5 tab daily), Disp: 90 tablet, Rfl: 3  •  nitroglycerin (NITROSTAT) 0.4 MG SL tablet, 1 under the tongue as needed for angina, may repeat q5mins for up three doses, Disp: 100 tablet, Rfl: 11  •  Ostomy Supplies ([employer] Flat Barrier 57MM) wafer, USE 4 PER WEEK., Disp: , Rfl:  "  •  Probiotic Product (PROBIOTIC ADVANCED PO), Take  by mouth Daily., Disp: , Rfl:   •  torsemide (DEMADEX) 5 MG tablet, Take 1 tablet by mouth Daily As Needed (increased edema)., Disp: 30 tablet, Rfl: 5    History of Present Illness: Patient returns for scheduled 4-month follow up. The patient is accompanied to the office today by his wife who confirms his history. The patient denies any current cardiopulmonary issues. Patient denies chest pain, shortness of breath, palpitations, edema, dizziness, and syncope. He states that he does not have the same energy he used to have secondary to radiation and immunotherapy. He also was found to have diverticulitis. He is able to perform his normal activities without issue. Patient has had no additional interim ER visits, hospitalizations, serious illnesses, or injuries.        ROS   Obtained and negative except as outlined in problem list and HPI.    Procedures       Objective:       Vitals:    01/21/22 1453 01/21/22 1456 01/21/22 1507   BP: 138/94 130/79 138/76   BP Location: Left arm Left arm Right arm   Patient Position: Sitting Sitting Sitting   Cuff Size:   Adult   Pulse: 65 68    SpO2: 98%     Weight: 80.4 kg (177 lb 3.2 oz)     Height: 177.8 cm (70\")       Body mass index is 25.43 kg/m².  Last weight: 176 lbs    Vitals reviewed.   Constitutional:       Appearance: Well-developed.   Neck:      Thyroid: No thyromegaly.      Vascular: No carotid bruit or JVD.      Lymphadenopathy: No cervical adenopathy.   Pulmonary:      Effort: Pulmonary effort is normal.      Breath sounds: Decreased breath sounds present. No wheezing. No rhonchi. No rales.   Cardiovascular:      Regular rhythm.      Murmurs: There is a grade 3/6 harsh midsystolic murmur at the URSB, radiating to the neck.      No gallop. No S3 gallop.   Pulses:     Dorsalis pedis: 1+ bilaterally.     Posterior tibial: 1+ bilaterally.  Abdominal:      Palpations: Abdomen is soft. There is no abdominal mass.      " Tenderness: There is no abdominal tenderness.   Musculoskeletal: Normal range of motion. Skin:     General: Skin is warm and dry.      Findings: No rash.   Neurological:      Mental Status: Alert and oriented to person, place, and time.           Lab Review:   Lab Results   Component Value Date    GLUCOSE 98 05/11/2021    BUN 22 05/11/2021    CREATININE 1.38 (H) 05/11/2021    CREATININE 1.50 (H) 05/11/2021    EGFRIFNONA 50 (L) 05/11/2021    BCR 15.9 05/11/2021     05/11/2021    K 4.2 05/11/2021     05/11/2021    CO2 27.0 05/11/2021    CALCIUM 9.0 05/11/2021       Lab Results   Component Value Date    WBC 6.72 05/11/2021    HGB 14.5 05/11/2021    HCT 43.1 05/11/2021    MCV 87.4 05/11/2021     05/11/2021       Lab Results   Component Value Date    TSH 3.438 12/21/2021           Assessment:       Overall continued acceptable course with no new interim cardiopulmonary complaints with acceptable functional status. We will defer additional diagnostic or therapeutic intervention from a cardiac perspective at this time.     Diagnosis Plan   1. Ischemic heart disease  No recurrent angina pectoris or CHF symptoms. Continue current treatment.   2. S/P AVR (aortic valve replacement)  Stable examination. Will repeat his echocardiogram in 6 months.   3. Essential hypertension  Well controlled. Continue current treatment,   4. Dyslipidemia  No new data to compare.   5. Ankle edema, bilateral  Resolved; continue current treatment.   6. Smokeless tobacco use  Cessation urged.          Plan:         1. Patient to continue current medications and close follow up with the above providers.  2. Tentative cardiology follow up in July 2023 with same day echocardiogram or patient may return sooner PRN.    Scribed for Chandu Pereira MD by Daniel Davenport. 1/21/2022  15:08 DUONG    I, Chandu Pereira MD, [facility], personally performed the services described in this documentation as scribed by the above named individual in my  presence, and it is both accurate and complete. At 15:31 EST on 01/21/2022

## 2022-07-21 NOTE — PROGRESS NOTES
Subjective:     Encounter Date:07/22/2022    Patient ID: Sherron Boyle is a 82 y.o.  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     PHYSICIAN:  Sandy Corcoran MD  REMOTE INTERNIST: Jose Martin Munguia MD  CARDIOVASCULAR SURGEON: Kali Curry MD   UROLOGIST:  Jayce Delgado MD  SLEEP MD:  Khloe Caputo MD  ANGIOGRAPHER: Cody Vallecillo MD, [facility]  UROLOGIC SURGEON: Naldo Cheney MD  RADIATION ONCOLOGIST: Lanre Akins MD ([place])  ONCOLOGIST/HEMATOLOGIST:  Viviana Cantrell MD ([place])    Chief Complaint:   Chief Complaint   Patient presents with   • Ischemic heart disease       Problem List:  1. 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 Remote progressive NYHA class II-III exertional dyspnea/fatigue syndrome with CCS class II-III chest pain syndrome with abnormal EKG and abnormal echocardiogram with acceptable Cardiolite GXT. LVEF (0.58), July 2006.   b. Severe 2.5-vessel obstructive coronary artery disease with 3-vessel coronary disease and severe aortic stenosis with subsequent aortocoronary bypass graft placement x2 (RSVG PDA and RCA; RSVG distal OM nondominant LCx) with #21 Magna precordial aortic valve replacement and reduction aortic valve annuloplasty with possible small non ST elevation myocardial infarction and acceptable discharge echocardiographic study, August 2006.   c. Residual CCS class I anginal pectoris/NYHA class II exertional dyspnea/fatigue syndrome with acceptable echocardiogram. LVEF (0.62), December 2007.   d. BRENDA revealing grade II atheromatous disease of the transverse aorta and evidence of grade III atheroma in the descending aorta (atheroma less than or equal to 5 mm) with mild concentric LVH, normal sinus rhythm with negative IV saline contrast study and LVEF estimated at greater than 60%,  09/17/2009.  e. Acceptable echocardiographic GXT. LVEF (0.64) with nominal aortic valve function and mild left atrial enlargement with acceptable ascending, transverse, and proximal descending thoracic aorta, November 2009.  f. Remote dizziness/presyncope with transient chest pain and abnormal event recorder with acceptable combination Doppler echocardiogram and carotid duplex study as well as Cardiolite GXT, spring 2013, with residual class I symptoms.  g. Residual class I symptoms with acceptable stable echocardiogram. LVEF (0.72), December 2011; LVEF (0.65), November 2014.  h. Remote CCS class II chest pain/NYHA class II exertional dyspnea with acceptable limited echocardiographic GXT with exercise to 93% PMHR and only 75% predicted exercise capacity, January 2017.  i. Echocardiogram 2/27/2019: LV systolic function normal, mild TR, RVSP 25 mmHg, mild to moderate aortic stenosis, LV diastolic dysfunction grade 1 consistent with impaired relaxation, normal RV wall thickness, systolic function and septal motion noted with RV cavity mildly dilated, no pericardial effusion, no pulmonary hypertension, moderate MAC present, there is a bioprosthetic valve present with aortic peak and mean gradients elevated.  j. CCS class III USA/NYHA class I dyspnea on exertion, April 2021  k. Echocardiogram, May 2021: LVEF 0.66-0.70. Grade I LV diastolic dysfunction. Mild concentric LVH. Trivial transvalvular regurgitation of the bioprosthetic aortic valve. Mild stenosis or obstruction of the prosthetic aortic valve. Mean gradient 23 mmHg.  l. [facility], May 2021: Patent SVG-RCA and SVG-OM, LAD with minimal nonobstructive disease. Moderate to severe bioprosthetic aortic valve gradient. LVEDP 25 mmHg.  m. Residual class I symptoms, September 2021, January 2022, July 2022.  n. Echocardiogram, 07/22/2022: LVEF 0.68. Bioprosthetic aortic valve present, with peak (290.2 cm/s) and median (20 mmHg) gradients elevated. RVSP from TR normal. Normal RV  cavity size, wall thickness, systolic function, and septal motion. LV diastolic function consistent with grade I impaired relaxation. No evidence of PHTN or pericardial effusion. No significant change from 11 May 2021 and 27 February 2019 echocardiograms.  2. Chronic hypertension, probable essential.   3. Dyslipidemia; on statin therapy.  4. Remote tobacco use, with current smokeless tobacco use of 1 can/week.   5. Remote pneumonia, 1970.  6. Remote football injury with concussion, 1950.  7. Chronic lower tract obstructive symptoms, probable BPH.   8. Post-procedure weight gain and possible mild volume overload, October 2006.  9. Chronic hypothyroidism/replacement therapy.   10. Apparent bladder cancer with therapy and intermittent recurrent cystoscopy, autumn 2006 with subsequent cystectomy and positive lymph node with diversion of ureters- data deficit, June 2021 S[facility] with subsequent referral to [facility] for treatment followed by 21 radiation treatments and subsequent initiation of immunotherapy.  11. Remote left podagra/possible gout attack -- data deficit, November 2008.  12. Erectile dysfunction.   13. Stage 3 chronic kidney disease.   14. Sleep apnea with abnormal outpatient screening study with subsequent apparent polysomnogram, data deficit (March 2016).  15. Ankle edema    Allergies   Allergen Reactions   • Lipitor [Atorvastatin] Myalgia     arthralgias/myalgias.   • Lisinopril Cough     paroxysmal cough.       Current Outpatient Medications:   •  aspirin 81 MG EC tablet, Take 81 mg by mouth Daily., Disp: , Rfl:   •  atorvastatin (LIPITOR) 40 MG tablet, Take 1 tablet by mouth Daily., Disp: , Rfl:   •  famotidine (PEPCID) 10 MG tablet, Take 10 mg by mouth 2 (Two) Times a Day As Needed for Heartburn., Disp: , Rfl:   •  Incontinence Supplies ([employer]) misc, See Admin Instructions., Disp: , Rfl:   •  levothyroxine (SYNTHROID, LEVOTHROID) 25 MCG tablet, Take 25  "mcg by mouth Daily., Disp: , Rfl:   •  losartan (COZAAR) 50 MG tablet, Take 1 tablet by mouth Daily. (Patient taking differently: Take 50 mg by mouth Daily. 0.5 tab daily), Disp: 90 tablet, Rfl: 3  •  nitroglycerin (NITROSTAT) 0.4 MG SL tablet, 1 under the tongue as needed for angina, may repeat q5mins for up three doses, Disp: 100 tablet, Rfl: 11  •  Ostomy Supplies (New [employer] Flat Barrier 57MM) wafer, USE 4 PER WEEK., Disp: , Rfl:   •  Probiotic Product (PROBIOTIC ADVANCED PO), Take  by mouth Daily., Disp: , Rfl:   •  torsemide (DEMADEX) 5 MG tablet, Take 1 tablet by mouth Daily As Needed (increased edema)., Disp: 30 tablet, Rfl: 5    History of Present Illness: Patient returns for scheduled 6-month follow up. Since last visit, the patient has been doing well overall from a cardiovascular standpoint. He reports that he mows in the mornings, when it is cooler. He also states that he \"take lots of breaks and drink lots of water.\" He reports that he has had laboratory studies drawn since his last visit, which were unremarkable with the exception of a slight elevation in BUN-data deficit. He still goes to [place] for his immunotherapy treatments on a monthly basis, and will have CT scans done every three months. He had an echocardiogram before coming to the office today. This was read and reviewed with him in office, and a report was given to him. He and his wife will be going to the mountains of [place]. His wife, who accompanies him to the office today, reports that he is still on immunotherapy. He is no longer skiing. Patient denies chest pain, shortness of breath, orthopnea, palpitations, edema, dizziness, and syncope. He has had no interim ER visits, hospitalizations, serious illnesses, or surgeries. He is accompanied to the office today by his wife, who confirms his history.         ROS   Obtained and negative except as outlined in problem list and HPI.    Procedures       Objective:       Vitals:    " "07/22/22 1302 07/22/22 1305   BP: 116/60 122/68   BP Location: Right arm Right arm   Patient Position: Sitting Standing   Pulse: 61 64   SpO2: 98%    Weight: 82.6 kg (182 lb)    Height: 177.8 cm (70\")      Body mass index is 26.11 kg/m².  Last weight: 177 lbs    Vitals reviewed.   Constitutional:       Appearance: Well-developed.   HENT:    Mouth/Throat:      Lips: Pink. No lesions.      Mouth: Mucous membranes are moist. No injury, lacerations, oral lesions or angioedema.      Dentition: Normal dentition. Does not have dentures. No dental tenderness, gingival swelling, dental caries, dental abscesses or gum lesions.      Tongue: No lesions. Tongue does not deviate from midline.      Palate: No mass and lesions.      Pharynx: Oropharynx is clear. No pharyngeal swelling, oropharyngeal exudate, posterior oropharyngeal erythema or uvula swelling.      Tonsils: No tonsillar exudate or tonsillar abscesses.   Neck:      Thyroid: No thyromegaly.      Vascular: No carotid bruit or JVD.      Lymphadenopathy: No cervical adenopathy.   Pulmonary:      Effort: Pulmonary effort is normal.      Breath sounds: Decreased breath sounds present. No wheezing. No rhonchi. No rales.   Cardiovascular:      Regular rhythm.      Murmurs: There is a grade 3/6 harsh midsystolic murmur at the URSB, radiating to the neck.      No gallop. No S3 gallop.   Pulses:     Dorsalis pedis: 1+ bilaterally.     Posterior tibial: 1+ bilaterally.  Edema:     Peripheral edema present.     Pretibial: bilateral trace edema of the pretibial area.     Ankle: bilateral trace edema of the ankle.  Abdominal:      Palpations: Abdomen is soft. There is no abdominal mass.      Tenderness: There is no abdominal tenderness.   Musculoskeletal: Normal range of motion. Skin:     General: Skin is warm and dry.      Findings: No rash.   Neurological:      Mental Status: Alert and oriented to person, place, and time.       Lab Review:     No recent laboratory studies " available for review today.    Lab Results   Component Value Date    GLUCOSE 98 05/11/2021    BUN 22 05/11/2021    CREATININE 1.38 (H) 05/11/2021    CREATININE 1.50 (H) 05/11/2021    EGFRIFNONA 50 (L) 05/11/2021    BCR 15.9 05/11/2021     05/11/2021    K 4.2 05/11/2021     05/11/2021    CO2 27.0 05/11/2021    CALCIUM 9.0 05/11/2021       Lab Results   Component Value Date    WBC 6.72 05/11/2021    HGB 14.5 05/11/2021    HCT 43.1 05/11/2021    MCV 87.4 05/11/2021     05/11/2021       Lab Results   Component Value Date    TSH 2.303 07/07/2022       · Echocardiogram, 22 July 2022  · There is a bioprosthetic aortic valve present. The prosthetic aortic valve peak and mean gradients are elevated.  · Peak velocity of the flow distal to the aortic valve is 290.2 cm/s. Aortic valve mean pressure gradient is 20 mmHg.  · Estimated right ventricular systolic pressure from tricuspid regurgitation is normal (<35 mmHg).  · Estimated left ventricular EF = 68% Estimated left ventricular EF was in agreement with the calculated left ventricular EF. Left ventricular ejection fraction appears to be 66 - 70%. Left ventricular systolic function is normal.  · Left ventricular diastolic function is consistent with (grade I) impaired relaxation.  · Normal right ventricular cavity size, wall thickness, systolic function and septal motion noted.  · No evidence of pulmonary hypertension is present.  · There is no evidence of pericardial effusion.  · No significant change from 11 May 2021 and 27 February 2019 echocardiographic study reports.            Assessment:       Overall continued acceptable course with no new interim cardiopulmonary complaints with good functional status. We will defer additional diagnostic or therapeutic intervention from a cardiac perspective at this time. The patient had an echocardiogram before coming to our office today; this was read, and a report with those results was given to him. Hopefully  any upcoming laboratory study results can be shared with us for review.       Diagnosis Plan   1. Ischemic heart disease  No recurrent angina pectoris or CHF on current activity schedule; continue current treatment   2. S/P AVR (aortic valve replacement)  Stable clinical examination. Continue current treatment.   3. Essential hypertension  Excellent control. Continue current treatment.   4. Dyslipidemia  No recent FLP to review. Continue current treatment, including heart healthy diet, exercise, and cardiac medications.   5. Ankle edema, bilateral  Stable and largely asymptomatic.   6. Smokeless tobacco use  Cessation strongly encouraged.          Plan:         1. Patient to continue current medications and close follow up with the above providers.  2. Patient is encouraged to implement a regular aerobic exercise routine, at least 30 minutes daily, 4-5 days per week.  3. Tentative cardiology follow up in July 2023 with same day echocardiogram or patient may return sooner PRN.    Scribed for Chandu Preeira MD by Alexandra Elizondo. 7/22/2022  13:21 EDT    I, Chandu Pereira MD, [facility], personally performed the services described in this documentation as scribed by the above named individual in my presence, and it is both accurate and complete. At 13:52 EDT on 07/22/2022

## 2022-07-22 ENCOUNTER — HOSPITAL ENCOUNTER (OUTPATIENT)
Dept: CARDIOLOGY | Facility: HOSPITAL | Age: 82
Discharge: HOME | End: 2022-07-22
Admitting: INTERNAL MEDICINE

## 2022-07-22 ENCOUNTER — OFFICE VISIT (OUTPATIENT)
Dept: CARDIOLOGY | Facility: CLINIC | Age: 82
End: 2022-07-22

## 2022-07-22 VITALS — BODY MASS INDEX: 25.34 KG/M2 | WEIGHT: 177 LBS | HEIGHT: 70 IN

## 2022-07-22 VITALS
OXYGEN SATURATION: 98 % | BODY MASS INDEX: 26.05 KG/M2 | WEIGHT: 182 LBS | DIASTOLIC BLOOD PRESSURE: 68 MMHG | SYSTOLIC BLOOD PRESSURE: 122 MMHG | HEART RATE: 64 BPM | HEIGHT: 70 IN

## 2022-07-22 DIAGNOSIS — I10 ESSENTIAL HYPERTENSION: ICD-10-CM

## 2022-07-22 DIAGNOSIS — Z95.2 S/P AVR (AORTIC VALVE REPLACEMENT): ICD-10-CM

## 2022-07-22 DIAGNOSIS — Z72.0 SMOKELESS TOBACCO USE: ICD-10-CM

## 2022-07-22 DIAGNOSIS — E78.5 DYSLIPIDEMIA: ICD-10-CM

## 2022-07-22 DIAGNOSIS — M25.472 ANKLE EDEMA, BILATERAL: ICD-10-CM

## 2022-07-22 DIAGNOSIS — I25.9 ISCHEMIC HEART DISEASE: Primary | ICD-10-CM

## 2022-07-22 DIAGNOSIS — M25.471 ANKLE EDEMA, BILATERAL: ICD-10-CM

## 2022-07-22 LAB
ASCENDING AORTA: 3.9 CM
BH CV ECHO MEAS - AO MAX PG: 33.7 MMHG
BH CV ECHO MEAS - AO MEAN PG: 20 MMHG
BH CV ECHO MEAS - AO ROOT DIAM: 2.6 CM
BH CV ECHO MEAS - AO V2 MAX: 290.2 CM/SEC
BH CV ECHO MEAS - AO V2 VTI: 72.4 CM
BH CV ECHO MEAS - AVA(I,D): 0.92 CM2
BH CV ECHO MEAS - EDV(CUBED): 125 ML
BH CV ECHO MEAS - EDV(MOD-SP2): 89.6 ML
BH CV ECHO MEAS - EDV(MOD-SP4): 86.3 ML
BH CV ECHO MEAS - EF(MOD-BP): 66.5 %
BH CV ECHO MEAS - EF(MOD-SP2): 65.6 %
BH CV ECHO MEAS - EF(MOD-SP4): 67.2 %
BH CV ECHO MEAS - ESV(CUBED): 34.9 ML
BH CV ECHO MEAS - ESV(MOD-SP2): 30.8 ML
BH CV ECHO MEAS - ESV(MOD-SP4): 28.3 ML
BH CV ECHO MEAS - FS: 34.6 %
BH CV ECHO MEAS - IVS/LVPW: 1.02 CM
BH CV ECHO MEAS - IVSD: 0.8 CM
BH CV ECHO MEAS - LA DIMENSION: 4.2 CM
BH CV ECHO MEAS - LAT PEAK E' VEL: 8.1 CM/SEC
BH CV ECHO MEAS - LV DIASTOLIC VOL/BSA (35-75): 43.5 CM2
BH CV ECHO MEAS - LV MASS(C)D: 133.8 GRAMS
BH CV ECHO MEAS - LV MAX PG: 4 MMHG
BH CV ECHO MEAS - LV MEAN PG: 2.32 MMHG
BH CV ECHO MEAS - LV SYSTOLIC VOL/BSA (12-30): 14.3 CM2
BH CV ECHO MEAS - LV V1 MAX: 100.3 CM/SEC
BH CV ECHO MEAS - LV V1 VTI: 24.1 CM
BH CV ECHO MEAS - LVIDD: 5 CM
BH CV ECHO MEAS - LVIDS: 3.3 CM
BH CV ECHO MEAS - LVOT AREA: 2.8 CM2
BH CV ECHO MEAS - LVOT DIAM: 1.87 CM
BH CV ECHO MEAS - LVPWD: 0.8 CM
BH CV ECHO MEAS - MED PEAK E' VEL: 6.8 CM/SEC
BH CV ECHO MEAS - MV A MAX VEL: 99.4 CM/SEC
BH CV ECHO MEAS - MV DEC SLOPE: 296.5 CM/SEC2
BH CV ECHO MEAS - MV DEC TIME: 0.29 MSEC
BH CV ECHO MEAS - MV E MAX VEL: 81.8 CM/SEC
BH CV ECHO MEAS - MV E/A: 0.82
BH CV ECHO MEAS - MV MAX PG: 4.2 MMHG
BH CV ECHO MEAS - MV MEAN PG: 1.66 MMHG
BH CV ECHO MEAS - MV P1/2T: 93 MSEC
BH CV ECHO MEAS - MV V2 VTI: 42.8 CM
BH CV ECHO MEAS - MVA(P1/2T): 2.37 CM2
BH CV ECHO MEAS - MVA(VTI): 1.56 CM2
BH CV ECHO MEAS - PA ACC TIME: 0.06 SEC
BH CV ECHO MEAS - PA PR(ACCEL): 50.5 MMHG
BH CV ECHO MEAS - PA V2 MAX: 103.2 CM/SEC
BH CV ECHO MEAS - RAP SYSTOLE: 3 MMHG
BH CV ECHO MEAS - RVSP: 25 MMHG
BH CV ECHO MEAS - SI(MOD-SP2): 29.7 ML/M2
BH CV ECHO MEAS - SI(MOD-SP4): 29.3 ML/M2
BH CV ECHO MEAS - SV(LVOT): 66.7 ML
BH CV ECHO MEAS - SV(MOD-SP2): 58.8 ML
BH CV ECHO MEAS - SV(MOD-SP4): 58 ML
BH CV ECHO MEAS - TAPSE (>1.6): 1.84 CM
BH CV ECHO MEAS - TR MAX PG: 22.3 MMHG
BH CV ECHO MEAS - TR MAX VEL: 236.2 CM/SEC
BH CV ECHO MEASUREMENTS AVERAGE E/E' RATIO: 10.98
BH CV VAS BP LEFT ARM: NORMAL MMHG
BH CV XLRA - RV BASE: 3.4 CM
BH CV XLRA - RV LENGTH: 6.9 CM
BH CV XLRA - RV MID: 2.37 CM
BH CV XLRA - TDI S': 10 CM/SEC
LEFT ATRIUM VOLUME INDEX: 14.9 ML/M2
LV EF 2D ECHO EST: 68 %
MAXIMAL PREDICTED HEART RATE: 140 BPM
STRESS TARGET HR: 119 BPM

## 2022-07-22 PROCEDURE — 93306 TTE W/DOPPLER COMPLETE: CPT

## 2022-07-22 PROCEDURE — 99214 OFFICE O/P EST MOD 30 MIN: CPT | Performed by: INTERNAL MEDICINE

## 2022-07-22 PROCEDURE — 93306 TTE W/DOPPLER COMPLETE: CPT | Performed by: INTERNAL MEDICINE

## 2022-09-28 ENCOUNTER — TELEPHONE (OUTPATIENT)
Dept: CARDIOLOGY | Facility: CLINIC | Age: 82
End: 2022-09-28

## 2022-09-28 DIAGNOSIS — R60.9 SWELLING: Primary | ICD-10-CM

## 2022-09-28 DIAGNOSIS — R06.09 DOE (DYSPNEA ON EXERTION): ICD-10-CM

## 2022-09-28 NOTE — TELEPHONE ENCOUNTER
I was able to review his CT scan from [facility] from June 2022 and it showed his AAA was 4.1 cm.  A CT scan will be a better indicator of size than an x-ray.  But, I will review the records from [facility] whenever they are available and compare; further decisions upon review.  Please have the patient monitor his blood pressure/heart rate twice a day.  He may use an extra torsemide for increased shortness of breath.  I am concerned that his RLE is warm to the touch.  I am glad he is having a lower extremity venous duplex tomorrow.  His echocardiogram from July 2022 was stable and had no significant changes from his last echocardiograms the past few years.  Please order a proBNP, D-dimer, CBC.  Did he get prescribed any antibiotics concerning his RLE that is swollen and warm to touch? If his symptoms worsen, he needs to go to ED.

## 2022-09-28 NOTE — TELEPHONE ENCOUNTER
Called pt and gave CINDY Anderson recommendations above. Pt verbalizes understanding and agreeable to plan.    Lab orders to be faxed to PCP at F# [phone] to be drawn tomorrow.

## 2022-09-28 NOTE — TELEPHONE ENCOUNTER
Patient went to PCP, for following symptoms, yesterday 9/27/22 who ordered xray. Hip and pelvic x-ray showed >5 cm AAA below kidney. Pt complains of weakness, light-headedness, SOB on light exertion, pain in right hip and right leg, right leg swollen, warm to touch. Pt is having RLE venous duplex and ECHO tomorrow.     Does not have BP/HR readings, however was told it was normal by PCP.     Pt denies chest pain, palps.     Pt's wife concerned with increasing size of AAA.    Records requested from [facility].         Please advise.

## 2022-10-04 ENCOUNTER — TELEPHONE (OUTPATIENT)
Dept: CARDIOLOGY | Facility: CLINIC | Age: 82
End: 2022-10-04

## 2022-10-04 DIAGNOSIS — R06.09 DOE (DYSPNEA ON EXERTION): Primary | ICD-10-CM

## 2022-10-04 DIAGNOSIS — R79.89 ELEVATED D-DIMER: ICD-10-CM

## 2022-10-04 NOTE — TELEPHONE ENCOUNTER
Called pt regarding elevated d-dimer. Per Clarisse Vernon, AHSAN scan. Discussed [place] recommendations with pt's wife. Pt's wife verbalizes understanding and agreeable to plan.

## 2022-11-01 RX ORDER — LOSARTAN POTASSIUM 50 MG/1
25 TABLET ORAL DAILY
Qty: 45 TABLET | Refills: 1 | Status: SHIPPED | OUTPATIENT
Start: 2022-11-01

## 2022-11-09 ENCOUNTER — TELEPHONE (OUTPATIENT)
Dept: CARDIOLOGY | Facility: CLINIC | Age: 82
End: 2022-11-09

## 2022-11-09 NOTE — TELEPHONE ENCOUNTER
Pt's wife over head paged for appt with CINDY Anderson for three week history of BLE swelling, worse in right leg, extreme fatigue, and SOB, symptoms have increased since Thursday. Fatigue and SOB is so severe that pt is unable to perform any activity. Pt has lost 10 lbs, not intentional, in past 4-6 weeks. Pt has had dizziness upon standing with vomiting on Wednesday 11/2. Dizziness has increased since then, with standing and when walking.     Pt denies chest pain, palpitations.    Pt had labs, lower extremity duplex, and CTA chest at [place] on 10/10/22. Records can be accessed in Care Everywhere.    /79 not sure of HR    Please advise.

## 2022-11-09 NOTE — TELEPHONE ENCOUNTER
Called and spoke to pt's wife, gave CINDY Anderson recommendations above. Pt's wife verbalizes understanding and agreeable to plan.

## 2022-11-09 NOTE — TELEPHONE ENCOUNTER
Since the patient has had worsening symptoms, I would advise him to go to the ED for evaluation.  His lower extremity venous duplex was negative for DVT in September 2022 and CTA chest was negative for PE October 2022.

## 2022-11-10 ENCOUNTER — APPOINTMENT (OUTPATIENT)
Dept: GENERAL RADIOLOGY | Facility: HOSPITAL | Age: 82
End: 2022-11-10

## 2022-11-10 ENCOUNTER — HOSPITAL ENCOUNTER (EMERGENCY)
Facility: HOSPITAL | Age: 82
Discharge: HOME | End: 2022-11-10
Attending: EMERGENCY MEDICINE | Admitting: EMERGENCY MEDICINE

## 2022-11-10 VITALS
HEIGHT: 70 IN | RESPIRATION RATE: 16 BRPM | WEIGHT: 172 LBS | HEART RATE: 61 BPM | SYSTOLIC BLOOD PRESSURE: 119 MMHG | TEMPERATURE: 97.9 F | DIASTOLIC BLOOD PRESSURE: 73 MMHG | OXYGEN SATURATION: 97 % | BODY MASS INDEX: 24.62 KG/M2

## 2022-11-10 DIAGNOSIS — R53.1 GENERALIZED WEAKNESS: Primary | ICD-10-CM

## 2022-11-10 DIAGNOSIS — R06.09 DYSPNEA ON EXERTION: ICD-10-CM

## 2022-11-10 DIAGNOSIS — N28.9 MILD RENAL INSUFFICIENCY: ICD-10-CM

## 2022-11-10 DIAGNOSIS — Z95.2 HISTORY OF AORTIC VALVE REPLACEMENT: ICD-10-CM

## 2022-11-10 LAB
ALBUMIN SERPL-MCNC: 3.9 G/DL (ref 3.5–5.2)
ALBUMIN/GLOB SERPL: 1.1 G/DL
ALP SERPL-CCNC: 104 U/L (ref 39–117)
ALT SERPL W P-5'-P-CCNC: 15 U/L (ref 1–41)
ANION GAP SERPL CALCULATED.3IONS-SCNC: 10 MMOL/L (ref 5–15)
AST SERPL-CCNC: 14 U/L (ref 1–40)
BASOPHILS # BLD AUTO: 0.05 10*3/MM3 (ref 0–0.2)
BASOPHILS NFR BLD AUTO: 0.7 % (ref 0–1.5)
BILIRUB SERPL-MCNC: 0.5 MG/DL (ref 0–1.2)
BUN SERPL-MCNC: 18 MG/DL (ref 8–23)
BUN/CREAT SERPL: 11.3 (ref 7–25)
CALCIUM SPEC-SCNC: 9.3 MG/DL (ref 8.6–10.5)
CHLORIDE SERPL-SCNC: 95 MMOL/L (ref 98–107)
CO2 SERPL-SCNC: 33 MMOL/L (ref 22–29)
CREAT SERPL-MCNC: 1.59 MG/DL (ref 0.76–1.27)
DEPRECATED RDW RBC AUTO: 46.3 FL (ref 37–54)
EGFRCR SERPLBLD CKD-EPI 2021: 43.6 ML/MIN/1.73
EOSINOPHIL # BLD AUTO: 0.19 10*3/MM3 (ref 0–0.4)
EOSINOPHIL NFR BLD AUTO: 2.6 % (ref 0.3–6.2)
ERYTHROCYTE [DISTWIDTH] IN BLOOD BY AUTOMATED COUNT: 14.7 % (ref 12.3–15.4)
GLOBULIN UR ELPH-MCNC: 3.4 GM/DL
GLUCOSE SERPL-MCNC: 86 MG/DL (ref 65–99)
HCT VFR BLD AUTO: 37.7 % (ref 37.5–51)
HGB BLD-MCNC: 12.3 G/DL (ref 13–17.7)
HOLD SPECIMEN: NORMAL
IMM GRANULOCYTES # BLD AUTO: 0.03 10*3/MM3 (ref 0–0.05)
IMM GRANULOCYTES NFR BLD AUTO: 0.4 % (ref 0–0.5)
LYMPHOCYTES # BLD AUTO: 0.6 10*3/MM3 (ref 0.7–3.1)
LYMPHOCYTES NFR BLD AUTO: 8.1 % (ref 19.6–45.3)
MCH RBC QN AUTO: 28 PG (ref 26.6–33)
MCHC RBC AUTO-ENTMCNC: 32.6 G/DL (ref 31.5–35.7)
MCV RBC AUTO: 85.7 FL (ref 79–97)
MONOCYTES # BLD AUTO: 0.84 10*3/MM3 (ref 0.1–0.9)
MONOCYTES NFR BLD AUTO: 11.3 % (ref 5–12)
NEUTROPHILS NFR BLD AUTO: 5.71 10*3/MM3 (ref 1.7–7)
NEUTROPHILS NFR BLD AUTO: 76.9 % (ref 42.7–76)
NRBC BLD AUTO-RTO: 0 /100 WBC (ref 0–0.2)
NT-PROBNP SERPL-MCNC: 238.8 PG/ML (ref 0–1800)
PLATELET # BLD AUTO: 233 10*3/MM3 (ref 140–450)
PMV BLD AUTO: 8.9 FL (ref 6–12)
POTASSIUM SERPL-SCNC: 4.1 MMOL/L (ref 3.5–5.2)
PROT SERPL-MCNC: 7.3 G/DL (ref 6–8.5)
QT INTERVAL: 378 MS
QTC INTERVAL: 408 MS
RBC # BLD AUTO: 4.4 10*6/MM3 (ref 4.14–5.8)
SODIUM SERPL-SCNC: 138 MMOL/L (ref 136–145)
TROPONIN T SERPL-MCNC: 0.01 NG/ML (ref 0–0.03)
WBC NRBC COR # BLD: 7.42 10*3/MM3 (ref 3.4–10.8)
WHOLE BLOOD HOLD COAG: NORMAL
WHOLE BLOOD HOLD SPECIMEN: NORMAL

## 2022-11-10 PROCEDURE — 84484 ASSAY OF TROPONIN QUANT: CPT

## 2022-11-10 PROCEDURE — 85025 COMPLETE CBC W/AUTO DIFF WBC: CPT

## 2022-11-10 PROCEDURE — 93005 ELECTROCARDIOGRAM TRACING: CPT | Performed by: EMERGENCY MEDICINE

## 2022-11-10 PROCEDURE — 99284 EMERGENCY DEPT VISIT MOD MDM: CPT | Performed by: INTERNAL MEDICINE

## 2022-11-10 PROCEDURE — 71045 X-RAY EXAM CHEST 1 VIEW: CPT

## 2022-11-10 PROCEDURE — 99284 EMERGENCY DEPT VISIT MOD MDM: CPT

## 2022-11-10 PROCEDURE — 93005 ELECTROCARDIOGRAM TRACING: CPT

## 2022-11-10 PROCEDURE — 80053 COMPREHEN METABOLIC PANEL: CPT

## 2022-11-10 PROCEDURE — 83880 ASSAY OF NATRIURETIC PEPTIDE: CPT

## 2022-11-10 RX ORDER — SODIUM CHLORIDE 0.9 % (FLUSH) 0.9 %
10 SYRINGE (ML) INJECTION AS NEEDED
Status: DISCONTINUED | OUTPATIENT
Start: 2022-11-10 | End: 2022-11-10 | Stop reason: HOSPADM

## 2022-11-10 NOTE — ED PROVIDER NOTES
[place]    EMERGENCY DEPARTMENT ENCOUNTER      Pt Name: Sherron Boyle  MRN: [id number]  YOB: 1940  Date of evaluation: 11/10/2022  Provider: Owen Canchola DO    CHIEF COMPLAINT       Chief Complaint   Patient presents with   • Weakness - Generalized         HISTORY OF PRESENT ILLNESS  (Location/Symptom, Timing/Onset, Context/Setting, Quality, Duration, Modifying Factors, Severity.)   Sherron Boyle is a 82 y.o. male who presents to the emergency department for evaluation of generalized weakness, shortness of breath, dyspnea with exertion which been present over the last 2 to 3 weeks.  Patient does even with minimal exertion just 1 strip of mowing the lawn he is to sit down for 5 minutes secondary to fatigue and shortness of breath.  This is abnormal for him compared to his normal baseline.  He does note history of 16 years ago of a aortic valve replacement.  He follows with cardiologist, Dr. Pereira.  Denies any chest pain.  Patient also undergoing chemoradiation therapy for prostate and bladder cancer following through [facility].  He notes about a week ago he had a low-grade fever, chills, generalized muscle aches, did not receive his last round of therapy secondary to his complaints at that time.  This was towards the end of his treatment plan as December is the last time he supposed to receive any type of therapy.  He denies any fevers currently, no cough or congestion, does not wear supplemental oxygen.  Denies any unilateral weakness, numbness or tingling, no abdominal pain, no nausea vomiting diarrhea.  Denies any other acute systemic complaints this time.  He states he had a work-up for the symptoms while he was recently at [facility] which included lower extremity duplex without any signs of DVT, also CT of the chest abdomen and pelvis with IV contrast which did not reveal any acute abnormalities.      Nursing notes were reviewed.    REVIEW OF SYSTEMS    (2-9 systems for level 4, 10 or  more for level 5)   ROS:  General:  No fevers, no chills, + generalized weakness  Cardiovascular:  No chest pain, no palpitations  Respiratory:  + Minimal exertional shortness of breath, no cough, no wheezing  Gastrointestinal:  No pain, no nausea, no vomiting, no diarrhea  Musculoskeletal:  No muscle pain, no joint pain  Skin:  No rash  Neurologic:  No speech problems, no headache, no extremity numbness, no extremity tingling, no extremity weakness  Psychiatric:  No anxiety  Genitourinary:  No dysuria, no hematuria    Except as noted above the remainder of the review of systems was reviewed and negative.       PAST MEDICAL HISTORY     Past Medical History:   Diagnosis Date   • Bladder cancer ([facility]) 2006    Apparent bladder cancer with therapy and intermittent recurrent cystoscopy, autumn 2006.   • BPH (benign prostatic hypertrophy)     Chronic lower tract obstructive symptoms, probable BPH.    • Chronic kidney disease, stage III (moderate) ([facility])    • Concussion 1950    Remote football injury with concussion, 1950.   • COPD (chronic obstructive pulmonary disease) ([facility])    • Dyslipidemia    • Erectile dysfunction    • Gout 11/2008    Recent left podagra/possible gout attack -- data deficit, November 2008.   • Hypertension     Chronic hypertension, probable essential.    • Hypothyroidism     Chronic hypothyroidism/replacement therapy.    • Ischemic heart disease    • Non-ST elevated myocardial infarction (HCC)     Severe 2.5-vessel obstructive coronary artery disease with 3-vessel coronary disease and severe aortic stenosis with subsequent aortocoronary bypass graft placement x2 (RSVG PDA and RCA; RSVG distal OM nondominant LCx) with #21 Magna precordial aortic valve replacement and reduction aortic valve annuloplasty with possible small non ST elevation myocardial infarction and acceptable discharge echoca   • Pneumonia 1970    Remote pneumonia, 1970.   • Tobacco abuse     Remote tobacco use, resolved.    • Weight  gain 2006    Post-procedure weight gain and possible mild volume overload, October 2006.         SURGICAL HISTORY       Past Surgical History:   Procedure Laterality Date   • AORTIC VALVE REPAIR/REPLACEMENT  08/2006    Severe 2.5-vessel obstructive coronary artery disease with 3-vessel coronary disease and severe aortic stenosis with subsequent aortocoronary bypass graft placement x2 (RSVG PDA and RCA; RSVG distal OM nondominant LCx) with #21 Magna precordial aortic valve replacement and reduction aortic valve annuloplasty with possible small non ST elevation myocardial infarction and acceptable discharge echoca   • AORTIC VALVE SURGERY     • BLADDER SURGERY      removal   • CARDIAC CATHETERIZATION     • CARDIAC CATHETERIZATION Left 5/11/2021    Procedure: Left Heart Cath- continue ASA. c19 locally and aware to bring results w/ him.;  Surgeon: Cody Vallecillo MD;  Location: [facility] CATH INVASIVE LOCATION;  Service: Cardiology;  Laterality: Left;   • CORONARY ARTERY BYPASS GRAFT  08/2006    Severe 2.5-vessel obstructive coronary artery disease with 3-vessel coronary disease and severe aortic stenosis with subsequent aortocoronary bypass graft placement x2 (RSVG PDA and RCA; RSVG distal OM nondominant LCx) with #21 Magna precordial aortic valve replacement and reduction aortic valve annuloplasty with possible small non ST elevation myocardial infarction and acceptable discharge echoca   • PROSTATECTOMY     • BRENDA  09/17/2009    BRENDA revealing grade II atheromatous disease of the transverse aorta and evidence of grade III atheroma in the descending aorta (atheroma less than or equal to 5 mm) with mild concentric LVH, normal sinus rhythm with negative IV saline contrast study and LVEF estimated at greater than 60%, 09/17/2009.         CURRENT MEDICATIONS       Current Facility-Administered Medications:   •  sodium chloride 0.9 % flush 10 mL, 10 mL, Intravenous, PRN, Owen Canchola DO    Current Outpatient  Medications:   •  aspirin 81 MG EC tablet, Take 81 mg by mouth Daily., Disp: , Rfl:   •  atorvastatin (LIPITOR) 40 MG tablet, Take 1 tablet by mouth Daily., Disp: , Rfl:   •  famotidine (PEPCID) 10 MG tablet, Take 10 mg by mouth 2 (Two) Times a Day As Needed for Heartburn., Disp: , Rfl:   •  Incontinence Supplies ([place] Entry Close System) misc, See Admin Instructions., Disp: , Rfl:   •  levothyroxine (SYNTHROID, LEVOTHROID) 25 MCG tablet, Take 25 mcg by mouth Daily., Disp: , Rfl:   •  losartan (COZAAR) 50 MG tablet, Take 0.5 tablets by mouth Daily. 0.5 tab daily, Disp: 45 tablet, Rfl: 1  •  nitroglycerin (NITROSTAT) 0.4 MG SL tablet, 1 under the tongue as needed for angina, may repeat q5mins for up three doses, Disp: 100 tablet, Rfl: 11  •  Ostomy Supplies (New Image Flat Barrier 57MM) wafer, USE 4 PER WEEK., Disp: , Rfl:   •  Probiotic Product (PROBIOTIC ADVANCED PO), Take  by mouth Daily., Disp: , Rfl:   •  torsemide (DEMADEX) 5 MG tablet, Take 1 tablet by mouth Daily As Needed (increased edema)., Disp: 30 tablet, Rfl: 5    ALLERGIES     Lipitor [atorvastatin] and Lisinopril    FAMILY HISTORY       Family History   Problem Relation Age of Onset   • Heart attack Mother    • Leukemia Father    • Heart attack Paternal Grandfather           SOCIAL HISTORY       Social History     Socioeconomic History   • Marital status:    Tobacco Use   • Smoking status: Former     Packs/day: 1.00     Types: Cigarettes     Quit date: 2001     Years since quittin.9   • Smokeless tobacco: Current     Types: Chew   Vaping Use   • Vaping Use: Never used   Substance and Sexual Activity   • Alcohol use: Yes     Alcohol/week: 3.0 standard drinks     Types: 3 Cans of beer per week     Comment:  weekly    • Drug use: No   • Sexual activity: Defer         PHYSICAL EXAM    (up to 7 for level 4, 8 or more for level 5)     Vitals:    11/10/22 1130 11/10/22 1600 11/10/22 1630 11/10/22 1700   BP: 132/85 111/80 115/72 119/73  "  BP Location: Left arm      Patient Position: Sitting      Pulse: 74  60 61   Resp: 16      Temp: 97.9 °F (36.6 °C)      TempSrc: Oral      SpO2: 98% 98% 97% 97%   Weight: 78 kg (172 lb)      Height: 177.8 cm (70\")          Physical Exam  General : Patient is awake, alert, oriented, in no acute distress, nontoxic appearing  HEENT: Pupils are equally round, EOMI, conjunctivae clear, there is no injection no icterus.  Oral mucosa is moist, uvula midline  Neck: Neck is supple, full range of motion, trachea midline  Cardiac: Heart regular rate, rhythm, positive systolic click  Lungs: Lungs are clear to auscultation, there is no wheezing, rhonchi, or rales. There is no use of accessory muscles  Chest wall: There is no tenderness to palpation over the chest wall or over ribs  Abdomen: Abdomen is soft, nontender, nondistended.  Right lower anterior abdominal ostomy site present, healthy pink tissue noted, positive drainage in the ostomy bag.  There are no firm or pulsatile masses, no rebound rigidity or guarding  Musculoskeletal: No peripheral edema, 5 out of 5 strength in all 4 extremities.  No focal muscle deficits are appreciated  Neuro: Motor intact, sensory intact, level of consciousness is normal, GCS 15  Dermatology: Skin is warm and dry  Psych: Mentation is grossly normal, cognition is grossly normal. Affect is appropriate      DIAGNOSTIC RESULTS     EKG: All EKGs are interpreted by the Emergency Department Physician who either signs or Co-signs this chart in the absence of a cardiologist.    ECG 12 Lead ED Triage Standing Order; SOA   Final Result   Test Reason : ED Triage Standing Order~   Blood Pressure :   */*   mmHG   Vent. Rate :  70 BPM     Atrial Rate :  70 BPM      P-R Int : 180 ms          QRS Dur :  76 ms       QT Int : 378 ms       P-R-T Axes :  75   8  92 degrees      QTc Int : 408 ms      Normal sinus rhythm   Inferior infarct , age undetermined   Abnormal ECG   No previous ECGs available   Confirmed " by RENZO BARRIGA MD ([id number]) on 11/10/2022 3:58:51 PM      Referred By: EDMD           Confirmed By: RENZO BARRIGA MD          RADIOLOGY:   Non-plain film images such as CT, Ultrasound and MRI are read by the radiologist. Plain radiographic images are visualized and preliminarily interpreted by the emergency physician with the below findings:      [] Radiologist's Report Reviewed:  XR Chest 1 View   Final Result   No acute cardiopulmonary process.       This report was finalized on 11/10/2022 12:50 PM by Connie Norwood MD.                ED BEDSIDE ULTRASOUND:   Performed by ED Physician - none    LABS:    I have reviewed and interpreted all of the currently available lab results from this visit (if applicable):  Results for orders placed or performed during the hospital encounter of 11/10/22   Comprehensive Metabolic Panel    Specimen: Blood   Result Value Ref Range    Glucose 86 65 - 99 mg/dL    BUN 18 8 - 23 mg/dL    Creatinine 1.59 (H) 0.76 - 1.27 mg/dL    Sodium 138 136 - 145 mmol/L    Potassium 4.1 3.5 - 5.2 mmol/L    Chloride 95 (L) 98 - 107 mmol/L    CO2 33.0 (H) 22.0 - 29.0 mmol/L    Calcium 9.3 8.6 - 10.5 mg/dL    Total Protein 7.3 6.0 - 8.5 g/dL    Albumin 3.90 3.50 - 5.20 g/dL    ALT (SGPT) 15 1 - 41 U/L    AST (SGOT) 14 1 - 40 U/L    Alkaline Phosphatase 104 39 - 117 U/L    Total Bilirubin 0.5 0.0 - 1.2 mg/dL    Globulin 3.4 gm/dL    A/G Ratio 1.1 g/dL    BUN/Creatinine Ratio 11.3 7.0 - 25.0    Anion Gap 10.0 5.0 - 15.0 mmol/L    eGFR 43.6 (L) >60.0 mL/min/1.73   BNP    Specimen: Blood   Result Value Ref Range    proBNP 238.8 0.0 - 1,800.0 pg/mL   Troponin    Specimen: Blood   Result Value Ref Range    Troponin T 0.014 0.000 - 0.030 ng/mL   CBC Auto Differential    Specimen: Blood   Result Value Ref Range    WBC 7.42 3.40 - 10.80 10*3/mm3    RBC 4.40 4.14 - 5.80 10*6/mm3    Hemoglobin 12.3 (L) 13.0 - 17.7 g/dL    Hematocrit 37.7 37.5 - 51.0 %    MCV 85.7 79.0 - 97.0 fL    MCH 28.0 26.6 - 33.0 pg    MCHC  "32.6 31.5 - 35.7 g/dL    RDW 14.7 12.3 - 15.4 %    RDW-SD 46.3 37.0 - 54.0 fl    MPV 8.9 6.0 - 12.0 fL    Platelets 233 140 - 450 10*3/mm3    Neutrophil % 76.9 (H) 42.7 - 76.0 %    Lymphocyte % 8.1 (L) 19.6 - 45.3 %    Monocyte % 11.3 5.0 - 12.0 %    Eosinophil % 2.6 0.3 - 6.2 %    Basophil % 0.7 0.0 - 1.5 %    Immature Grans % 0.4 0.0 - 0.5 %    Neutrophils, Absolute 5.71 1.70 - 7.00 10*3/mm3    Lymphocytes, Absolute 0.60 (L) 0.70 - 3.10 10*3/mm3    Monocytes, Absolute 0.84 0.10 - 0.90 10*3/mm3    Eosinophils, Absolute 0.19 0.00 - 0.40 10*3/mm3    Basophils, Absolute 0.05 0.00 - 0.20 10*3/mm3    Immature Grans, Absolute 0.03 0.00 - 0.05 10*3/mm3    nRBC 0.0 0.0 - 0.2 /100 WBC   ECG 12 Lead ED Triage Standing Order; SOA   Result Value Ref Range    QT Interval 378 ms    QTC Interval 408 ms   Green Top (Gel)   Result Value Ref Range    Extra Tube Hold for add-ons.    Lavender Top   Result Value Ref Range    Extra Tube hold for add-on    Gold Top - SST   Result Value Ref Range    Extra Tube Hold for add-ons.    Gray Top   Result Value Ref Range    Extra Tube Hold for add-ons.    Light Blue Top   Result Value Ref Range    Extra Tube Hold for add-ons.         All other labs were within normal range or not returned as of this dictation.      EMERGENCY DEPARTMENT COURSE and DIFFERENTIAL DIAGNOSIS/MDM:   Vitals:    Vitals:    11/10/22 1130 11/10/22 1600 11/10/22 1630 11/10/22 1700   BP: 132/85 111/80 115/72 119/73   BP Location: Left arm      Patient Position: Sitting      Pulse: 74  60 61   Resp: 16      Temp: 97.9 °F (36.6 °C)      TempSrc: Oral      SpO2: 98% 98% 97% 97%   Weight: 78 kg (172 lb)      Height: 177.8 cm (70\")          ED Course as of 11/10/22 1812   Thu Nov 10, 2022   1611   Sherron Boyle  Complete Transthoracic Echocardiogram with Complete Doppler and Color Flow  Order# [id number]  Reading physician: Chandu Pereira MD Ordering physician: Chandu Pereira MD Study date: 7/22/22      Patient " "Information    Patient Name   Sherron Boyle MRN   [id number] Legal Sex   Male  (Age)   1940 (82 y.o.)    Patient Hx Of Height, Weight, and Vitals    Height Weight BSA (Calculated - sq m) BMI (Calculated) Retired BMI (kg/m2) Pulse BP  177.8 cm (70\") 80.3 kg (177 lb) 1.98 sq meters 25.4       Blood Pressure at Time of Exam     Left Arm  Blood Pressure   141/76 mmHg          [facility] PACS Images     Show images for Adult Transthoracic Echo Complete w/ Color, Spectral and Contrast if necessary per protocol    Clinical Indication    Valvular Function  Dx: S/P AVR (aortic valve replacement) [Z95.2 (ICD-10-CM)]; Essential hypertension [I10 (ICD-10-CM)]  Comments: Exam to be read by Dr. Pereira    Interpretation Summary    · There is a bioprosthetic aortic valve present. The prosthetic aortic valve peak and mean gradients are elevated.  · Peak velocity of the flow distal to the aortic valve is 290.2 cm/s. Aortic valve mean pressure gradient is 20 mmHg.  · Estimated right ventricular systolic pressure from tricuspid regurgitation is normal (<35 mmHg).  · Estimated left ventricular EF = 68% Estimated left ventricular EF was in agreement with the calculated left ventricular EF. Left ventricular ejection fraction appears to be 66 - 70%. Left ventricular systolic function is normal.  · Left ventricular diastolic function is consistent with (grade I) impaired relaxation.  · Normal right ventricular cavity size, wall thickness, systolic function and septal motion noted.  · No evidence of pulmonary hypertension is present.  · There is no evidence of pericardial effusion.  · No significant change from 11 May 2021 and 2019 echocardiographic study reports.         [AP]      ED Course User Index  [AP] Owen Canchola DO       This is a pleasant 82-year-old male presents with shortness of breath, dyspnea with exertion, generalized weakness over the last few weeks.  He has a history of bladder and prostate cancer, " has had a previous aortic valve repair, is on the end of his chemoradiation therapy.  He is nontoxic-appearing on my evaluation, his vitals are stable, he is afebrile.  Does have a history of mild renal insufficiency, creatinine 1.6 today, he notes was 1.7 just over a week ago.  No signs of heart failure, troponin is normal.  White count 7.4, electrolytes are stable.  X-ray the chest does not reveal any acute pathology.  No pleural effusion.  I did discuss the case with on-call cardiology for an ED consult.   Per Cardiology:  He can stay on Torsemide 10mg daily and follow up with CINDY Anderson in 4-6 weeks. We discussed with patient and his wife and they are good with that plan. Thanks!     Blood work is stable as above.  On reevaluation patient resting comfortably.  He is seen and evaluated by cardiology who is comfortable with continuing torsemide, close follow-up with his cardiologist.  Patient and his wife are agreeable to this plan of care, continue with his medications previously discussed.  Return precautions discussed with the patient.  He feels comfortable with continuing with an outpatient work-up, return precautions discussed.    I had a discussion with the patient/family regarding diagnosis, diagnostic results, treatment plan, and medications.  The patient/family indicated understanding of these instructions.  I spent adequate time at the bedside preceding discharge necessary to personally discuss the aftercare instructions, giving patient education, providing explanations of the results of our evaluations/findings, and my decision making to assure that the patient/family understand the plan of care.  Time was allotted to answer questions at that time and throughout the ED course.  Emphasis was placed on timely follow-up after discharge.  I also discussed the potential for the development of an acute emergent condition requiring further evaluation, admission, or even surgical intervention. I  discussed that we found nothing during the visit today indicating the need for further workup, admission, or the presence of an unstable medical condition.  I encouraged the patient to return to the emergency department immediately for ANY concerns, worsening, new complaints, or if symptoms persist and unable to seek follow-up in a timely fashion.  The patient/family expressed understanding and agreement with this plan.  The patient will follow-up with their PCP in 1-2 days for reevaluation.       MEDICATIONS ADMINISTERED IN ED:  Medications   sodium chloride 0.9 % flush 10 mL (has no administration in time range)       PROCEDURES:  Procedures    CRITICAL CARE TIME    Total Critical Care time was 0 minutes, excluding separately reportable procedures.   There was a high probability of clinically significant/life threatening deterioration in the patient's condition which required my urgent intervention.      FINAL IMPRESSION      1. Generalized weakness    2. History of aortic valve replacement    3. Mild renal insufficiency    4. Dyspnea on exertion          DISPOSITION/PLAN     ED Disposition     ED Disposition   Discharge    Condition   Stable    Comment   --             PATIENT REFERRED TO:  Clarisse Vernon, CINDY  [address] the [facility]  [address]  [facility] [id number] [phone]    Schedule an appointment as soon as possible for a visit in 4 week(s)      Chandu Pereira MD  [address]  [address]  [facility] [id number] [phone]    Schedule an appointment as soon as possible for a visit   Your cardiologist specialist    Sandy Corcoran MD  [address]  [address]  [facility] [address]  [phone]    In 2 days      [facility] Emergency Department  [address] [id number] [phone]    If symptoms worsen      DISCHARGE MEDICATIONS:     Medication List      CONTINUE taking these medications    aspirin 81 MG EC tablet     atorvastatin 40 MG  tablet  Commonly known as: LIPITOR     [facility] Entry Close System misc     famotidine 10 MG tablet  Commonly known as: PEPCID     levothyroxine 25 MCG tablet  Commonly known as: SYNTHROID, LEVOTHROID     losartan 50 MG tablet  Commonly known as: COZAAR  Take 0.5 tablets by mouth Daily. 0.5 tab daily     New Image Flat Barrier 57MM wafer     nitroglycerin 0.4 MG SL tablet  Commonly known as: NITROSTAT  1 under the tongue as needed for angina, may repeat q5mins for up three doses     PROBIOTIC ADVANCED PO     torsemide 5 MG tablet  Commonly known as: Demadex  Take 1 tablet by mouth Daily As Needed (increased edema).                Comment: Please note this report has been produced using speech recognition software.      Owen Canchola DO  Attending Emergency Physician               Owen Canchola DO  11/10/22 1815

## 2022-11-10 NOTE — CONSULTS
[facility] at [facility]  CARDIOLOGY CONSULTATION NOTE    Sherron Boyle  : 1940  MRN:[id number]  Home Phone:[phone]    Date of Consultation: 11/10/22    PCP: Sandy Corcoran MD    IDENTIFICATION: A 82 y.o. male resident of [place], [place]     Chief Complaint   Patient presents with   • Weakness - Generalized     PROBLEM LIST:   Active Hospital Problems    Diagnosis  POA   • S/P AVR (aortic valve replacement) [Z95.2]  Not Applicable     · Severe AS with AVR  with #21 Magna pericardial aortic valve replacement  · Echo 2021: EF normal, there is mild stenosis or obstruction of the prosthetic aortic valve. Mean gradient 23 mmHg.       • Ischemic heart disease [I25.9]  Yes       a. Severe 2.5-vessel obstructive coronary artery disease with 3-vessel coronary disease and severe aortic stenosis with subsequent aortocoronary bypass graft placement x2 (RSVG PDA and RCA; RSVG distal OM nondominant LCx) with #21 Magna precordial aortic valve replacement and reduction aortic valve annuloplasty with possible small non ST elevation myocardial infarction and acceptable discharge echocardiographic study, 2006.   b. BRENDA revealing grade II atheromatous disease of the transverse aorta and evidence of grade III atheroma in the descending aorta (atheroma less than or equal to 5 mm) with mild concentric LVH, normal sinus rhythm with negative IV saline contrast study and LVEF estimated at greater than 60%, 2009.  c. LHC 2021: Patent SVG-RCA and SVG-OM, LAD with minimal nonobstructive disease. Moderate to severe bioprosthetic aortic valve gradient         • Hypertension [I10]  Yes     Chronic hypertension, probable essential.      • Dyslipidemia [E78.5]  Yes   • Chronic kidney disease, stage III (moderate) (HCC) [N18.30]  Yes   • Bladder cancer (HCC) [C67.9]  Yes     Apparent bladder cancer with therapy and intermittent recurrent cystoscopy, 2006.         ALLERGIES:   Allergies    Allergen Reactions   • Lipitor [Atorvastatin] Myalgia     arthralgias/myalgias.   • Lisinopril Cough     paroxysmal cough.       HOME MEDICINES:   Current Outpatient Medications   Medication Instructions   • aspirin 81 mg, Oral, Daily   • atorvastatin (LIPITOR) 40 MG tablet 1 tablet, Oral, Daily   • famotidine (PEPCID) 10 mg, Oral, 2 Times Daily PRN   • Incontinence Supplies ([employer] [place] Entry Close System) misc See Admin Instructions   • levothyroxine (SYNTHROID, LEVOTHROID) 25 mcg, Oral, Daily   • losartan (COZAAR) 25 mg, Oral, Daily, 0.5 tab daily   • nitroglycerin (NITROSTAT) 0.4 MG SL tablet 1 under the tongue as needed for angina, may repeat q5mins for up three doses   • Ostomy Supplies (New Image Flat Barrier 57MM) wafer USE 4 PER WEEK.   • Probiotic Product (PROBIOTIC ADVANCED PO) Oral, Daily   • torsemide (DEMADEX) 5 mg, Oral, Daily PRN       HPI: Mr. Boyle is a pleasant 81 y/o male with above noted history who is seen in consultation for LE edema, fatigue and worsening dyspnea for the past 2-3 weeks. He notes significant LE, in RLE>LLE, and had a negative venous duplex for DVT. He has also noted increased fatigue and dyspnea with activity, and has had to stop and rest due to the above. CTA chest obtained at [place] on 10/10 was negative for PE. He denies any angina, palpitations, syncope or pre-syncope. A week ago he did have a single episode of lightheadedness with standing and had nausea and vomiting along with it. No recurrent symptoms since then. He saw his PCP who started him on Torsemide for his LE edema, and increased it to 20mg for about a week with good response. Patient reports with the diuretic increase his LE edema has resolved, and he also noted a 10 lb weight loss. He has now decreased the Torsemide to 10mg and reports his edema is resolved. In the ER, his troponin is negative, proBNP is normal, CXR with no effusion or edema. His EKG is stable and his most recent echo in July showed  normal EF with mild AS.       ROS: All systems have been reviewed and are negative with the exception of those mentioned in the HPI and problem list above.    Surgical History:   Past Surgical History:   Procedure Laterality Date   • AORTIC VALVE REPAIR/REPLACEMENT  08/2006    Severe 2.5-vessel obstructive coronary artery disease with 3-vessel coronary disease and severe aortic stenosis with subsequent aortocoronary bypass graft placement x2 (RSVG PDA and RCA; RSVG distal OM nondominant LCx) with #21 Magna precordial aortic valve replacement and reduction aortic valve annuloplasty with possible small non ST elevation myocardial infarction and acceptable discharge echoca   • AORTIC VALVE SURGERY     • BLADDER SURGERY      removal   • CARDIAC CATHETERIZATION     • CARDIAC CATHETERIZATION Left 5/11/2021    Procedure: Left Heart Cath- continue ASA. c19 locally and aware to bring results w/ him.;  Surgeon: Cody Vallecillo MD;  Location: [facility] CATH INVASIVE LOCATION;  Service: Cardiology;  Laterality: Left;   • CORONARY ARTERY BYPASS GRAFT  08/2006    Severe 2.5-vessel obstructive coronary artery disease with 3-vessel coronary disease and severe aortic stenosis with subsequent aortocoronary bypass graft placement x2 (RSVG PDA and RCA; RSVG distal OM nondominant LCx) with #21 Magna precordial aortic valve replacement and reduction aortic valve annuloplasty with possible small non ST elevation myocardial infarction and acceptable discharge echoca   • PROSTATECTOMY     • BRENDA  09/17/2009    BRENDA revealing grade II atheromatous disease of the transverse aorta and evidence of grade III atheroma in the descending aorta (atheroma less than or equal to 5 mm) with mild concentric LVH, normal sinus rhythm with negative IV saline contrast study and LVEF estimated at greater than 60%, 09/17/2009.       Social History:   Social History     Socioeconomic History   • Marital status:    Tobacco Use   • Smoking status: Former      "Packs/day: 1.00     Types: Cigarettes     Quit date: 2001     Years since quittin.9   • Smokeless tobacco: Current     Types: Chew   Vaping Use   • Vaping Use: Never used   Substance and Sexual Activity   • Alcohol use: Yes     Alcohol/week: 3.0 standard drinks     Types: 3 Cans of beer per week     Comment:  weekly    • Drug use: No   • Sexual activity: Defer       Family History:   Family History   Problem Relation Age of Onset   • Heart attack Mother    • Leukemia Father    • Heart attack Paternal Grandfather        Objective     /85 (BP Location: Left arm, Patient Position: Sitting)   Pulse 74   Temp 97.9 °F (36.6 °C) (Oral)   Resp 16   Ht 177.8 cm (70\")   Wt 78 kg (172 lb)   SpO2 98%   BMI 24.68 kg/m²   No intake or output data in the 24 hours ending 11/10/22 1622    PHYSICAL EXAM:  CONSTITUTIONAL: Well nourished, cooperative, in no acute distress  HEENT: Normocephalic, atraumatic, PERRLA, no JVD  CARDIOVASCULAR:  Regular rhythm and normal rate, 2/6 systolic murmur, no gallop, rub.   RESPIRATORY: Clear to auscultation, normal respiratory effort, no wheezing, rales or rhonchi  EXTREMITIES: No gross deformities, no edema. Trace ankle edema   SKIN: Warm, dry. No bleeding, bruising or rash  NEUROLOGICAL: No focal deficits  PSYCHIATRIC: Normal mood and affect. Behavior is normal     Labs/Diagnostic Data  Results from last 7 days   Lab Units 11/10/22  1203   SODIUM mmol/L 138   POTASSIUM mmol/L 4.1   CHLORIDE mmol/L 95*   CO2 mmol/L 33.0*   BUN mg/dL 18   CREATININE mg/dL 1.59*   GLUCOSE mg/dL 86   CALCIUM mg/dL 9.3     Results from last 7 days   Lab Units 11/10/22  1203   TROPONIN T ng/mL 0.014     Results from last 7 days   Lab Units 11/10/22  1203   WBC 10*3/mm3 7.42   HEMOGLOBIN g/dL 12.3*   HEMATOCRIT % 37.7   PLATELETS 10*3/mm3 233             Results from last 7 days   Lab Units 22  1009   TSH mcunit/mL 4.438*         Results from last 7 days   Lab Units 11/10/22  1203   PROBNP " pg/mL 238.8         I personally reviewed the patient's EKG/Telemetry data    Radiology Data:   CXR 11/10/22:  IMPRESSION:  No acute cardiopulmonary process.    Assessment and Plan:     1. LE edema, increased fatigue and dyspnea  - symptoms resolved with addition of Torsemide 20mg, now decreased to 10mg  - proBNP is normal, CXR with no effusion or pulmonary edema. Had negative LE venous duplex and CTA chest as OP which was negative for PE  - he had recent echo 07/2022 with stable findings, normal EF, mild bioprosthetic AS  - at this time there is no indication for further evaluation/treatment and patient may be discharged home with close follow up with Clarisse Vernon in 4-6 weeks  - continue Torsemide 10mg daily     2. AS, s/p bioprosthetic AVR  - as above, recent echo 07/2022 with stable findings, normal EF    3. CAD  - no angina  - continue ASA, statin    4. Bladder cancer/ CKD III  - Cr at baseline  - followed by [facility]       Scribed for Cody Vallecillo MD by Mago Underwood PA-C. 11/10/2022  17:00 EST    I,Cody Vallecillo M.D., personally performed the services described in this documentation as scribed by the above named individual in my presence, and it is both accurate and complete.    Cody Vallecillo MD, [facility]  11/10/22  17:20 EST

## 2022-12-14 ENCOUNTER — OFFICE VISIT (OUTPATIENT)
Dept: CARDIOLOGY | Facility: CLINIC | Age: 82
End: 2022-12-14

## 2022-12-14 VITALS
WEIGHT: 181.6 LBS | DIASTOLIC BLOOD PRESSURE: 73 MMHG | HEIGHT: 69 IN | BODY MASS INDEX: 26.9 KG/M2 | SYSTOLIC BLOOD PRESSURE: 132 MMHG | HEART RATE: 63 BPM | OXYGEN SATURATION: 97 %

## 2022-12-14 DIAGNOSIS — I25.9 ISCHEMIC HEART DISEASE: Primary | ICD-10-CM

## 2022-12-14 DIAGNOSIS — I10 PRIMARY HYPERTENSION: ICD-10-CM

## 2022-12-14 DIAGNOSIS — E78.5 DYSLIPIDEMIA: ICD-10-CM

## 2022-12-14 DIAGNOSIS — Z95.2 S/P AVR (AORTIC VALVE REPLACEMENT): ICD-10-CM

## 2022-12-14 DIAGNOSIS — Z72.0 SMOKELESS TOBACCO USE: ICD-10-CM

## 2022-12-14 PROCEDURE — 99214 OFFICE O/P EST MOD 30 MIN: CPT | Performed by: NURSE PRACTITIONER

## 2022-12-14 RX ORDER — TORSEMIDE 20 MG/1
1 TABLET ORAL AS NEEDED
COMMUNITY
Start: 2022-10-31

## 2023-04-26 RX ORDER — LOSARTAN POTASSIUM 50 MG/1
TABLET ORAL
Qty: 45 TABLET | Refills: 1 | Status: SHIPPED | OUTPATIENT
Start: 2023-04-26

## 2023-06-11 NOTE — PROGRESS NOTES
Subjective:     Encounter Date:06/16/2023      Patient ID: Sherron Boyle is a 83 y.o.   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     PHYSICIAN:  Sandy Corcoran MD  REMOTE INTERNIST: Jose Martin Munguia MD  CARDIOVASCULAR SURGEON: Kali Curry MD   UROLOGIST:  Jayce Delgado MD  SLEEP MD:  Khloe Caputo MD  ANGIOGRAPHER: Cody Vallecillo MD, [facility]  UROLOGIC SURGEON: Naldo Cheney MD  RADIATION ONCOLOGIST: Lanre Akins MD ([place])  ONCOLOGIST/HEMATOLOGIST:  Viviana Cantrell MD ([place])    Chief Complaint:   Chief Complaint   Patient presents with   • Ischemic heart disease     Problem List:  1. 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 Remote progressive NYHA class II-III exertional dyspnea/fatigue syndrome with CCS class II-III chest pain syndrome with abnormal EKG and abnormal echocardiogram with acceptable Cardiolite GXT. LVEF (0.58), July 2006.  b. Severe 2.5-vessel obstructive coronary artery disease with 3-vessel coronary disease and severe aortic stenosis with subsequent aortocoronary bypass graft placement x2 (RSVG PDA and RCA; RSVG distal OM nondominant LCx) with #21 Magna precordial aortic valve replacement and reduction aortic valve annuloplasty with possible small non ST elevation myocardial infarction and acceptable discharge echocardiographic study, August 2006.   c. Residual CCS class I anginal pectoris/NYHA class II exertional dyspnea/fatigue syndrome with acceptable echocardiogram. LVEF (0.62), December 2007.   d. BRENDA revealing grade II atheromatous disease of the transverse aorta and evidence of grade III atheroma in the descending aorta (atheroma less than or equal to 5 mm) with mild concentric LVH, normal sinus rhythm with negative IV saline contrast study and LVEF estimated at greater than 60%,  09/17/2009.  e. Acceptable echocardiographic GXT. LVEF (0.64) with nominal aortic valve function and mild left atrial enlargement with acceptable ascending, transverse, and proximal descending thoracic aorta, November 2009.  f. Remote dizziness/presyncope with transient chest pain and abnormal event recorder with acceptable combination Doppler echocardiogram and carotid duplex study as well as Cardiolite GXT, spring 2013, with residual class I symptoms.  g. Residual class I symptoms with acceptable stable echocardiogram. LVEF (0.72), December 2011; LVEF (0.65), November 2014.  h. Remote CCS class II chest pain/NYHA class II exertional dyspnea with acceptable limited echocardiographic GXT with exercise to 93% PMHR and only 75% predicted exercise capacity, January 2017.  i. Echocardiogram 2/27/2019: LV systolic function normal, mild TR, RVSP 25 mmHg, mild to moderate aortic stenosis, LV diastolic dysfunction grade 1 consistent with impaired relaxation, normal RV wall thickness, systolic function and septal motion noted with RV cavity mildly dilated, no pericardial effusion, no pulmonary hypertension, moderate MAC present, there is a bioprosthetic valve present with aortic peak and mean gradients elevated.  j. CCS class III USA/NYHA class I dyspnea on exertion, April 2021  k. Echocardiogram, May 2021: LVEF 0.66-0.70. Grade I LV diastolic dysfunction. Mild concentric LVH. Trivial transvalvular regurgitation of the bioprosthetic aortic valve. Mild stenosis or obstruction of the prosthetic aortic valve. Mean gradient 23 mmHg.  l. [facility], May 2021: Patent SVG-RCA and SVG-OM, LAD with minimal nonobstructive disease. Moderate to severe bioprosthetic aortic valve gradient. LVEDP 25 mmHg.  m. Residual class I symptoms, September 2021, January 2022, July 2022.  n. Echocardiogram, 07/22/2022: LVEF 0.68. Bioprosthetic aortic valve present, with peak (290.2 cm/s) and median (20 mmHg) gradients elevated. RVSP from TR normal. Normal RV  cavity size, wall thickness, systolic function, and septal motion. LV diastolic function consistent with grade I impaired relaxation. No evidence of PHTN or pericardial effusion. No significant change from 11 May 2021 and 27 February 2019 echocardiograms.  2. Chronic hypertension, probable essential.   3. Dyslipidemia; on statin therapy.  4. Remote tobacco use, with current smokeless tobacco use of 1 can/week.  5. Remote pneumonia, 1970.  6. Remote football injury with concussion, 1950.  7. Chronic lower tract obstructive symptoms, probable BPH.   8. Post-procedure weight gain and possible mild volume overload, October 2006.  9. Chronic hypothyroidism/replacement therapy.   10. Apparent bladder cancer with therapy and intermittent recurrent cystoscopy, autumn 2006 with subsequent cystectomy and positive lymph node with diversion of ureters- data deficit, June 2021 S[facility] with subsequent referral to [facility] for treatment followed by 21 radiation treatments and subsequent initiation of immunotherapy.  11. Remote left podagra/possible gout attack -- data deficit, November 2008.  12. Erectile dysfunction.   13. Stage 3 chronic kidney disease.   14. Sleep apnea with abnormal outpatient screening study with subsequent apparent polysomnogram, data deficit (March 2016).  15. Ankle edema  16. Dyspnea September/October 2022 with CTA chest at [place] October 2022 negative for PE, demonstrated atherosclerosis, mild emphysema, groundglass opacity in the RLL had decreased in size.  17. Breakthrough COVID November 2022 with mild fatigue and chills, did not require hospitalization  18. AAA   a. 4.2 cm on abdominal ultrasound September 2022, concern for mild prominence of the proximal iliacs.  Limited visualization due to colostomy bag.  Atherosclerotic disease noted.  b. CTA abdomen/pelvis October 2022: Chronic abdominal aortic aneurysm in the infrarenal aorta which does not extend into the iliac  arteries.  Maximum axial diameter of 4.5 cm, slowly growing over the past 10 years.  Continued surveillance recommended.  c. CT abdomen[facility]pelvis 6[facility]12/20[facility] at [facility] with AAA measuring 4.4 cm    Allergies   Allergen Reactions   • Lipitor [Atorvastatin] Myalgia     arthralgias/myalgias.   • Lisinopril Cough     paroxysmal cough.       Current Outpatient Medications   Medication Instructions   • aspirin 81 mg, Oral, Daily   • atorvastatin (LIPITOR) 40 MG tablet 1 tablet, Oral, Daily   • famotidine (PEPCID) 10 mg, Oral, 2 Times Daily PRN   • Incontinence Supplies ([employer] Entry Close System) misc See Admin Instructions   • levothyroxine (SYNTHROID, LEVOTHROID) 25 mcg, Oral, Daily   • losartan (COZAAR) 50 MG tablet TAKE 1/2 TABLET BY MOUTH DAILY   • nitroglycerin (NITROSTAT) 0.4 MG SL tablet 1 under the tongue as needed for angina, may repeat q5mins for up three doses   • Ostomy Supplies (New Image Flat Barrier 57MM) wafer USE 4 PER WEEK.   • Probiotic Product (PROBIOTIC ADVANCED PO) 1 tablet, Oral, Daily   • torsemide (DEMADEX) 20 MG tablet 1 tablet, Oral, As Needed         HISTORY OF PRESENT ILLNESS:  The patient is here for 6-month follow-up.The patient had an echocardiogram before coming to our office today; this will be read, and a letter will be sent to the patient with those results.  The patient gets antibiotics before he has dental cleanings.  He denies any chest pain, shortness of breath, palpitations, dizziness, presyncope, or syncope.  He has not taken his blood pressure medication yet today.  He occasionally will have some pedal edema, particularly if he is in the car for a long time.  He is staying very active doing a lot of yard work lately.  He just had laboratory testing this week and will have the results of this sent to me for review.  He was told that his sodium was abnormal and his creatinine was elevated on his prior testing.  The patient's blood pressures at home are normally in  "the 120s/70s.  He is going to try and stay hydrated this summer.  The patient just had a scan for his AAA at [facility] and was told that it was 4.4 cm.      ROS   All other systems reviewed and otherwise negative.    Procedures       Objective:       Vitals:    06/16/23 1001 06/16/23 1004 06/16/23 1018   BP: 157/76 152/77 130/70   BP Location: Left arm Left arm Right arm   Patient Position: Sitting Standing Sitting   Pulse: 56 57    SpO2: 99%     Weight: 81.2 kg (179 lb)     Height: 175.3 cm (69\")       Body mass index is 26.43 kg/m².  Last weight December 2022 was 181 pounds  Constitutional:       Appearance: Healthy appearance. Not in distress.   HENT:    Mouth/Throat:      Dentition: No gum lesions.      Tongue: No lesions.   Neck:      Vascular: No JVR. JVD normal.   Pulmonary:      Effort: Pulmonary effort is normal.      Breath sounds: Normal breath sounds. No wheezing. No rhonchi. No rales.   Chest:      Chest wall: Not tender to palpatation.   Cardiovascular:      PMI at left midclavicular line. Normal rate. Regular rhythm. Normal S1. Normal S2.      Murmurs: There is a grade 3/6 harsh midsystolic murmur at the URSB, radiating to the neck.      No gallop. No click. No rub.   Pulses:     Dorsalis pedis: 2+ bilaterally.     Posterior tibial: 2+ bilaterally.  Edema:     Ankle: bilateral trace edema of the ankle.     Feet: bilateral trace edema of the feet.  Abdominal:      General: Bowel sounds are normal.      Palpations: Abdomen is soft.      Tenderness: There is no abdominal tenderness.   Musculoskeletal: Normal range of motion.         General: No tenderness. Skin:     General: Skin is warm and dry.   Neurological:      General: No focal deficit present.      Mental Status: Alert and oriented to person, place and time.           Lab Review:   Lab Results   Component Value Date    GLUCOSE 86 11/10/2022    BUN 18 11/10/2022    CREATININE 1.59 (H) 11/10/2022    EGFRIFNONA 50 (L) 05/11/2021    EGFRIFAFRI 69 " 07/26/2018    BCR 11.3 11/10/2022    CO2 33.0 (H) 11/10/2022    CALCIUM 9.3 11/10/2022    PROTENTOTREF 7.1 07/26/2018    ALBUMIN 3.90 11/10/2022    LABIL2 1.9 07/26/2018    AST 14 11/10/2022    ALT 15 11/10/2022       Lab Results   Component Value Date    WBC 7.42 11/10/2022    HGB 12.3 (L) 11/10/2022    HCT 37.7 11/10/2022    MCV 85.7 11/10/2022     11/10/2022           Lab Results   Component Value Date    TSH 3.477 03/08/2023         Lab Results   Component Value Date    TRIG 123 07/26/2018     Lab Results   Component Value Date    HDL 56 07/26/2018     Lab Results   Component Value Date    LDL 74 07/26/2018           Lab Results   Component Value Date    BNP 27.0 03/06/2019           Assessment:     Overall continued acceptable course with no new interim cardiopulmonary complaints with acceptable functional status. We will defer additional diagnostic or therapeutic intervention from a cardiac perspective at this time. The patient's echocardiogram in July 2022 had no significant change from his last echocardiogram in May 2021 and February 2019.  The patient had an echocardiogram before coming to our office today; this will be read, and a letter will be sent to the patient with those results.        Diagnosis Plan   1. S/P AVR (aortic valve replacement)  The patient had an echocardiogram before coming to our office today; this will be read, and a letter will be sent to the patient with those results.      2. Ischemic heart disease  No recurrent angina pectoris or CHF on current activity schedule; continue current treatment      3. Primary hypertension   Controlled, continue current cardiac medications      4. Dyslipidemia   No new labs to review, continue atorvastatin             Plan:         1. Patient to continue current medications and close follow up with the above providers.  2. Tentative cardiology follow up in December 2023 or patient may return sooner PRN.  3. The patient had an echocardiogram  before coming to our office today; this will be read, and a letter will be sent to the patient with those results.     Electronically signed by CINDY Bravo, 06/16/23, 10:21 AM EDT.

## 2023-06-16 ENCOUNTER — HOSPITAL ENCOUNTER (OUTPATIENT)
Dept: CARDIOLOGY | Facility: HOSPITAL | Age: 83
Discharge: HOME | End: 2023-06-16
Payer: MEDICARE

## 2023-06-16 ENCOUNTER — OFFICE VISIT (OUTPATIENT)
Dept: CARDIOLOGY | Facility: CLINIC | Age: 83
End: 2023-06-16
Payer: MEDICARE

## 2023-06-16 VITALS
HEART RATE: 57 BPM | SYSTOLIC BLOOD PRESSURE: 130 MMHG | DIASTOLIC BLOOD PRESSURE: 70 MMHG | HEIGHT: 69 IN | BODY MASS INDEX: 26.51 KG/M2 | OXYGEN SATURATION: 99 % | WEIGHT: 179 LBS

## 2023-06-16 VITALS
DIASTOLIC BLOOD PRESSURE: 91 MMHG | SYSTOLIC BLOOD PRESSURE: 155 MMHG | BODY MASS INDEX: 26.91 KG/M2 | WEIGHT: 181.66 LBS | HEIGHT: 69 IN

## 2023-06-16 DIAGNOSIS — Z95.2 S/P AVR (AORTIC VALVE REPLACEMENT): Primary | ICD-10-CM

## 2023-06-16 DIAGNOSIS — I10 PRIMARY HYPERTENSION: ICD-10-CM

## 2023-06-16 DIAGNOSIS — Z95.2 S/P AVR (AORTIC VALVE REPLACEMENT): ICD-10-CM

## 2023-06-16 DIAGNOSIS — E78.5 DYSLIPIDEMIA: ICD-10-CM

## 2023-06-16 DIAGNOSIS — I25.9 ISCHEMIC HEART DISEASE: ICD-10-CM

## 2023-06-16 LAB
ASCENDING AORTA: 3.6 CM
BH CV ECHO MEAS - AO MAX PG: 37 MMHG
BH CV ECHO MEAS - AO MEAN PG: 20 MMHG
BH CV ECHO MEAS - AO ROOT AREA (BSA CORRECTED): 1.7 CM2
BH CV ECHO MEAS - AO ROOT DIAM: 3.4 CM
BH CV ECHO MEAS - AO V2 MAX: 303 CM/SEC
BH CV ECHO MEAS - AO V2 VTI: 71 CM
BH CV ECHO MEAS - AVA(I,D): 1.1 CM2
BH CV ECHO MEAS - EDV(CUBED): 117.6 ML
BH CV ECHO MEAS - EDV(MOD-SP2): 86.3 ML
BH CV ECHO MEAS - EDV(MOD-SP4): 97.3 ML
BH CV ECHO MEAS - EF(MOD-BP): 57.3 %
BH CV ECHO MEAS - EF(MOD-SP2): 62.1 %
BH CV ECHO MEAS - EF(MOD-SP4): 56.3 %
BH CV ECHO MEAS - ESV(CUBED): 44.8 ML
BH CV ECHO MEAS - ESV(MOD-SP2): 32.7 ML
BH CV ECHO MEAS - ESV(MOD-SP4): 42.5 ML
BH CV ECHO MEAS - FS: 27.5 %
BH CV ECHO MEAS - IVS/LVPW: 1.13 CM
BH CV ECHO MEAS - IVSD: 0.9 CM
BH CV ECHO MEAS - LA DIMENSION: 3.6 CM
BH CV ECHO MEAS - LAT PEAK E' VEL: 6 CM/SEC
BH CV ECHO MEAS - LV MASS(C)D: 141.9 GRAMS
BH CV ECHO MEAS - LV MAX PG: 4.6 MMHG
BH CV ECHO MEAS - LV MEAN PG: 2 MMHG
BH CV ECHO MEAS - LV V1 MAX: 107 CM/SEC
BH CV ECHO MEAS - LV V1 VTI: 25.8 CM
BH CV ECHO MEAS - LVIDD: 4.9 CM
BH CV ECHO MEAS - LVIDS: 3.6 CM
BH CV ECHO MEAS - LVOT AREA: 2.9 CM2
BH CV ECHO MEAS - LVOT DIAM: 1.93 CM
BH CV ECHO MEAS - LVPWD: 0.8 CM
BH CV ECHO MEAS - MED PEAK E' VEL: 6.4 CM/SEC
BH CV ECHO MEAS - MV A MAX VEL: 96.8 CM/SEC
BH CV ECHO MEAS - MV DEC SLOPE: 259.8 CM/SEC2
BH CV ECHO MEAS - MV DEC TIME: 0.29 MSEC
BH CV ECHO MEAS - MV E MAX VEL: 99.8 CM/SEC
BH CV ECHO MEAS - MV E/A: 1.03
BH CV ECHO MEAS - MV MAX PG: 4.4 MMHG
BH CV ECHO MEAS - MV MEAN PG: 1.91 MMHG
BH CV ECHO MEAS - MV P1/2T: 109.3 MSEC
BH CV ECHO MEAS - MV V2 VTI: 38.8 CM
BH CV ECHO MEAS - MVA(P1/2T): 2.01 CM2
BH CV ECHO MEAS - MVA(VTI): 1.95 CM2
BH CV ECHO MEAS - PA ACC TIME: 0.11 SEC
BH CV ECHO MEAS - RAP SYSTOLE: 3 MMHG
BH CV ECHO MEAS - RVSP: 21 MMHG
BH CV ECHO MEAS - SV(LVOT): 75.6 ML
BH CV ECHO MEAS - SV(MOD-SP2): 53.6 ML
BH CV ECHO MEAS - SV(MOD-SP4): 54.8 ML
BH CV ECHO MEAS - TAPSE (>1.6): 1.22 CM
BH CV ECHO MEAS - TR MAX PG: 17.9 MMHG
BH CV ECHO MEAS - TR MAX VEL: 211.5 CM/SEC
BH CV ECHO MEASUREMENTS AVERAGE E/E' RATIO: 16.1
BH CV XLRA - RV BASE: 2.7 CM
BH CV XLRA - RV LENGTH: 7.1 CM
BH CV XLRA - RV MID: 1.9 CM
BH CV XLRA - TDI S': 9.4 CM/SEC
LEFT ATRIUM VOLUME INDEX: 23.8 ML/M2

## 2023-06-16 PROCEDURE — 93306 TTE W/DOPPLER COMPLETE: CPT | Performed by: INTERNAL MEDICINE

## 2023-06-16 PROCEDURE — 93306 TTE W/DOPPLER COMPLETE: CPT

## 2024-02-13 NOTE — PROGRESS NOTES
Subjective:     Encounter Date:02/21/2024      Patient ID: Sherron Boyle is a 83 y.o.   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     PHYSICIAN:  Sandy Corcoran MD  REMOTE INTERNIST: Jose Martin Munguia MD  CARDIOVASCULAR SURGEON: Kali Curry MD   UROLOGIST:  Jayce Delgado MD  SLEEP MD:  Khloe Caputo MD  ANGIOGRAPHER: Cody Vallecillo MD, [facility]  UROLOGIC SURGEON: Naldo Cheney MD  RADIATION ONCOLOGIST: Lanre Akins MD ([place])  ONCOLOGIST/HEMATOLOGIST:  Viviana Cantrell MD ([place]).    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 LVEF (0.58), July 2006.  Severe 2.5-vessel obstructive coronary artery disease with 3-vessel coronary disease and severe aortic stenosis with subsequent aortocoronary bypass graft placement x2 (RSVG PDA and RCA; RSVG distal OM nondominant LCx) with #21 Magna precordial aortic valve replacement and reduction aortic valve annuloplasty with possible small non ST elevation myocardial infarction and acceptable discharge echocardiographic study, August 2006.   Residual CCS class I anginal pectoris/NYHA class II exertional dyspnea/fatigue syndrome with acceptable echocardiogram. LVEF (0.62), December 2007.   BRENDA revealing grade II atheromatous disease of the transverse aorta and evidence of grade III atheroma in the descending aorta (atheroma less than or equal to 5 mm) with mild concentric LVH, normal sinus rhythm with  negative IV saline contrast study and LVEF estimated at greater than 60%, 09/17/2009.  Acceptable echocardiographic GXT. LVEF (0.64) with nominal aortic valve function and mild left atrial enlargement with acceptable ascending, transverse, and proximal descending thoracic aorta, November 2009.  Remote dizziness/presyncope with transient chest pain and abnormal event recorder with acceptable combination Doppler echocardiogram and carotid duplex study as well as Cardiolite GXT, spring 2013, with residual class I symptoms.  Residual class I symptoms with acceptable stable echocardiogram. LVEF (0.72), December 2011; LVEF (0.65), November 2014.  Remote CCS class II chest pain/NYHA class II exertional dyspnea with acceptable limited echocardiographic GXT with exercise to 93% PMHR and only 75% predicted exercise capacity, January 2017.  Echocardiogram 2/27/2019: LV systolic function normal, mild TR, RVSP 25 mmHg, mild to moderate aortic stenosis, LV diastolic dysfunction grade 1 consistent with impaired relaxation, normal RV wall thickness, systolic function and septal motion noted with RV cavity mildly dilated, no pericardial effusion, no pulmonary hypertension, moderate MAC present, there is a bioprosthetic valve present with aortic peak and mean gradients elevated.  CCS class III USA/NYHA class I dyspnea on exertion, April 2021  Echocardiogram, May 2021: LVEF 0.66-0.70. Grade I LV diastolic dysfunction. Mild concentric LVH. Trivial transvalvular regurgitation of the bioprosthetic aortic valve. Mild stenosis or obstruction of the prosthetic aortic valve. Mean gradient 23 mmHg.  [facility], May 2021: Patent SVG-RCA and SVG-OM, LAD with minimal nonobstructive disease. Moderate to severe bioprosthetic aortic valve gradient. LVEDP 25 mmHg.  Residual class I symptoms, September 2021, January 2022, July 2022.  Echocardiogram, 07/22/2022: LVEF 0.68. Bioprosthetic aortic valve present, with peak (290.2 cm/s) and median (20 mmHg)  gradients elevated. RVSP from TR normal. Normal RV cavity size, wall thickness, systolic function, and septal motion. LV diastolic function consistent with grade I impaired relaxation. No evidence of PHTN or pericardial effusion. No significant change from 11 May 2021 and 27 February 2019 echocardiograms.  Echocardiogram 6/16/2023: LVEF 57.3%, Left ventricular diastolic function is consistent with (grade II w/high LAP) pseudonormalization.  Trace AR, there is a bovine bioprosthetic aortic valve present. The aortic valve peak and mean gradients are elevated suggestive of moderate AS.  DIMITRI 1.1 cm², peak velocity of flow distal to the aortic valve 303 cm/s. Aortic valve maximum pressure gradient is 37 mmHg. Aortic valve mean pressure gradient is 20 mmHg. Gradients and velocities are stable from echocardiogram performed July 2022.  CCS class 0 angina/NYHA class II ASCENCIO symptoms February 2024  Chronic hypertension, probable essential.   Dyslipidemia; on statin therapy.  Remote tobacco use, with current smokeless tobacco use of 1 can/week.  Remote pneumonia, 1970.  Remote football injury with concussion, 1950.  Chronic lower tract obstructive symptoms, probable BPH.   Post-procedure weight gain and possible mild volume overload, October 2006.  Chronic hypothyroidism/replacement therapy.   Apparent bladder cancer with therapy and intermittent recurrent cystoscopy, autumn 2006 with subsequent cystectomy and positive lymph node with diversion of ureters- data deficit, June 2021 [facility] with subsequent referral to [facility] for treatment followed by 21 radiation treatments and subsequent initiation of immunotherapy.  Remote left podagra/possible gout attack -- data deficit, November 2008.  Erectile dysfunction.   Stage 3 chronic kidney disease.   Sleep apnea with abnormal outpatient screening study with subsequent apparent polysomnogram, data deficit (March 2016).  Ankle edema  Dyspnea September/October  2022 with CTA chest at [place] October 2022 negative for PE, demonstrated atherosclerosis, mild emphysema, groundglass opacity in the RLL had decreased in size.  Breakthrough COVID November 2022 with mild fatigue and chills, did not require hospitalization  AAA   4.2 cm on abdominal ultrasound September 2022, concern for mild prominence of the proximal iliacs.  Limited visualization due to colostomy bag.  Atherosclerotic disease noted.  CTA abdomen/pelvis October 2022: Chronic abdominal aortic aneurysm in the infrarenal aorta which does not extend into the iliac arteries.  Maximum axial diameter of 4.5 cm, slowly growing over the past 10 years.  Continued surveillance recommended.  CT abdomen[facility]pelvis 6/12/2023 at [facility] with AAA measuring 4.4 cm    Allergies   Allergen Reactions    Lipitor [Atorvastatin] Myalgia     arthralgias/myalgias.    Lisinopril Cough     paroxysmal cough.       Current Outpatient Medications   Medication Instructions    aspirin 81 mg, Oral, Daily    atorvastatin (LIPITOR) 40 MG tablet 1 tablet, Oral, Daily    famotidine (PEPCID) 10 mg, Oral, 2 Times Daily PRN    Incontinence Supplies ([place] Entry Close System) misc See Admin Instructions    levothyroxine (SYNTHROID, LEVOTHROID) 25 mcg, Oral, Daily    losartan (COZAAR) 50 MG tablet TAKE 1/2 TABLET BY MOUTH DAILY    nitroglycerin (NITROSTAT) 0.4 MG SL tablet 1 under the tongue as needed for angina, may repeat q5mins for up three doses    Ostomy Supplies ([employer] Flat Barrier 57MM) wafer USE 4 PER WEEK.    Probiotic Product (PROBIOTIC ADVANCED PO) 1 tablet, Oral, Daily    torsemide (DEMADEX) 20 MG tablet 1 tablet, Oral, As Needed         HISTORY OF PRESENT ILLNESS:  The patient is here after an 8-month hiatus.  He canceled his December 2023 appointment.  Patient had an echocardiogram in June 2023 showing moderate aortic stenosis but gradients and velocities stable from echocardiogram in July 2022.  The patient has  "noticed more shortness of breath on exertion and fatigue, particularly when he is rolling his trash can from the driveway up to his home.  He denies any chest pain, palpitations, dizziness, presyncope, syncope, or edema.  His blood pressures have been WNL.  He goes for routine laboratory testing next week with his PCP and will try to have the results of this sent to me for review.  He rarely uses torsemide. The patient is going for yearly AAA scan at [facility] in a couple months.  He has an upcoming tooth extraction but gets antibiotics before he has procedures.      ROS   All other systems reviewed and otherwise negative.      ECG 12 Lead    Date/Time: 2/21/2024 1:37 PM  Performed by: Clarisse Vernon APRN    Authorized by: Clarisse Vernon APRN  Rhythm comments: Normal sinus rhythm, normal ECG, 66 bpm,  ms, QRS 68 ms, QTc 436 ms, no significant changes from last ECG November 2022             Objective:       Vitals:    02/21/24 1324 02/21/24 1325   BP: 128/68 124/72   BP Location: Left arm Left arm   Patient Position: Sitting Standing   Pulse: 66    SpO2: 97%    Weight: 84 kg (185 lb 3.2 oz)    Height: 175.3 cm (69\")      Body mass index is 27.35 kg/m².  Last weight June 2023 was 179 pounds  Constitutional:       Appearance: Healthy appearance. Not in distress.   HENT:    Mouth/Throat:      Mouth: No oral lesions.      Dentition: No gum lesions.      Tongue: No lesions.   Neck:      Vascular: No JVR. JVD normal.   Pulmonary:      Effort: Pulmonary effort is normal.      Breath sounds: Normal breath sounds. No wheezing. No rhonchi. No rales.   Chest:      Chest wall: Not tender to palpatation.   Cardiovascular:      PMI at left midclavicular line. Normal rate. Regular rhythm. Normal S1. Normal S2.       Murmurs: There is a grade 3/6 systolic murmur at the URSB, radiating to the neck.      No gallop.  No click. No rub.   Pulses:     Intact distal pulses.   Edema:     Peripheral edema absent.   Abdominal:      " General: Bowel sounds are normal.      Palpations: Abdomen is soft.      Tenderness: There is no abdominal tenderness.   Musculoskeletal: Normal range of motion.         General: No tenderness. Skin:     General: Skin is warm and dry.   Neurological:      General: No focal deficit present.      Mental Status: Alert and oriented to person, place and time.           Lab Review:   Lab Results   Component Value Date    GLUCOSE 86 11/10/2022    BUN 18 11/10/2022    CREATININE 1.59 (H) 11/10/2022    EGFRIFNONA 50 (L) 05/11/2021    EGFRIFAFRI 69 07/26/2018    BCR 11.3 11/10/2022    CO2 33.0 (H) 11/10/2022    CALCIUM 9.3 11/10/2022    PROTENTOTREF 7.1 07/26/2018    ALBUMIN 3.90 11/10/2022    LABIL2 1.9 07/26/2018    AST 14 11/10/2022    ALT 15 11/10/2022       Lab Results   Component Value Date    WBC 7.42 11/10/2022    HGB 12.3 (L) 11/10/2022    HCT 37.7 11/10/2022    MCV 85.7 11/10/2022     11/10/2022       Lab Results   Component Value Date    TSH 3.477 03/08/2023       Lab Results   Component Value Date    TRIG 123 07/26/2018     Lab Results   Component Value Date    HDL 56 07/26/2018     Lab Results   Component Value Date    LDL 74 07/26/2018           Lab Results   Component Value Date    BNP 27.0 03/06/2019     Advance Care Planning   ACP discussion was held with the patient during this visit. Patient has an advance directive (not in EMR), copy requested.              Assessment:       Patient is followed at [facility] for his AAA which measured 4.4 cm June 2023. Patient had an echocardiogram in June 2023 showing moderate aortic stenosis but gradients and velocities stable from echocardiogram in July 2022.  Hopefully I will get to review the patient's next laboratory testing results.  ECG normal in office today.  He has noticed more exertional dyspnea and fatigue so I will order a repeat echocardiogram to assess the patient's aortic stenosis.     Diagnosis Plan   1. Ischemic heart disease  Echocardiogram same  day as next appointment      2. S/P AVR (aortic valve replacement)  Patient had an echocardiogram in June 2023 showing moderate aortic stenosis but gradients and velocities stable from echocardiogram in July 2022.        3. Primary hypertension  Controlled, continue current cardiac medications      4. Dyslipidemia  No new labs to review, continue atorvastatin   5.  Moderate aortic stenosis: Echocardiogram same day as next appointment          Plan:         Patient to continue current medications and close follow up with the above providers.  Tentative cardiology follow up in July 2024 or patient may return sooner PRN.  Echocardiogram same day as next appointment    Electronically signed by CINDY Bravo, 02/21/24, 2:02 PM EST.

## 2024-02-21 ENCOUNTER — OFFICE VISIT (OUTPATIENT)
Dept: CARDIOLOGY | Facility: CLINIC | Age: 84
End: 2024-02-21
Payer: MEDICARE

## 2024-02-21 VITALS
BODY MASS INDEX: 27.43 KG/M2 | WEIGHT: 185.2 LBS | OXYGEN SATURATION: 97 % | SYSTOLIC BLOOD PRESSURE: 124 MMHG | HEART RATE: 66 BPM | HEIGHT: 69 IN | DIASTOLIC BLOOD PRESSURE: 72 MMHG

## 2024-02-21 DIAGNOSIS — I10 PRIMARY HYPERTENSION: ICD-10-CM

## 2024-02-21 DIAGNOSIS — I25.9 ISCHEMIC HEART DISEASE: Primary | ICD-10-CM

## 2024-02-21 DIAGNOSIS — E78.5 DYSLIPIDEMIA: ICD-10-CM

## 2024-02-21 DIAGNOSIS — Z95.2 S/P AVR (AORTIC VALVE REPLACEMENT): ICD-10-CM

## 2024-02-21 DIAGNOSIS — I35.0 AORTIC STENOSIS, MODERATE: ICD-10-CM

## 2024-02-21 PROCEDURE — 1159F MED LIST DOCD IN RCRD: CPT | Performed by: NURSE PRACTITIONER

## 2024-02-21 PROCEDURE — 1160F RVW MEDS BY RX/DR IN RCRD: CPT | Performed by: NURSE PRACTITIONER

## 2024-02-21 PROCEDURE — 93000 ELECTROCARDIOGRAM COMPLETE: CPT | Performed by: NURSE PRACTITIONER

## 2024-02-21 PROCEDURE — 99214 OFFICE O/P EST MOD 30 MIN: CPT | Performed by: NURSE PRACTITIONER

## 2024-02-21 PROCEDURE — 3074F SYST BP LT 130 MM HG: CPT | Performed by: NURSE PRACTITIONER

## 2024-02-21 PROCEDURE — 3078F DIAST BP <80 MM HG: CPT | Performed by: NURSE PRACTITIONER

## 2024-05-06 RX ORDER — LOSARTAN POTASSIUM 50 MG/1
TABLET ORAL
Qty: 45 TABLET | Refills: 1 | Status: SHIPPED | OUTPATIENT
Start: 2024-05-06

## 2024-07-23 NOTE — PROGRESS NOTES
Subjective:     Encounter Date:07/25/2024      Patient ID: Sherron Boyle is a 84 y.o.  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     PHYSICIAN:  Sandy Corcoran MD  REMOTE INTERNIST: Jose Martin Munguia MD  CARDIOVASCULAR SURGEON: Kali Curry MD   UROLOGIST:  Jayce Delgado MD  SLEEP MD:  Khloe Caputo MD  ANGIOGRAPHER: Cody Vallecillo MD, [facility]  UROLOGIC SURGEON: Naldo Cheney MD  RADIATION ONCOLOGIST: Lanre Akins MD ([place])  ONCOLOGIST/HEMATOLOGIST:  Viviana Cantrell MD ([place]).    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 LVEF (0.58), July 2006.  Severe 2.5-vessel obstructive coronary artery disease with 3-vessel coronary disease and severe aortic stenosis with subsequent aortocoronary bypass graft placement x2 (RSVG PDA and RCA; RSVG distal OM nondominant LCx) with #21 Magna precordial aortic valve replacement and reduction aortic valve annuloplasty with possible small non ST elevation myocardial infarction and acceptable discharge echocardiographic study, August 2006.   Residual CCS class I anginal pectoris/NYHA class II exertional dyspnea/fatigue syndrome with acceptable echocardiogram. LVEF (0.62), December 2007.   BRENDA revealing grade II atheromatous disease of the transverse aorta and evidence of grade III atheroma in the descending aorta (atheroma less than or equal to 5 mm) with mild concentric LVH, normal sinus rhythm with negative IV saline contrast study and LVEF estimated at greater than 60%, 09/17/2009.  Acceptable  echocardiographic GXT. LVEF (0.64) with nominal aortic valve function and mild left atrial enlargement with acceptable ascending, transverse, and proximal descending thoracic aorta, November 2009.  Remote dizziness/presyncope with transient chest pain and abnormal event recorder with acceptable combination Doppler echocardiogram and carotid duplex study as well as Cardiolite GXT, spring 2013, with residual class I symptoms.  Residual class I symptoms with acceptable stable echocardiogram. LVEF (0.72), December 2011; LVEF (0.65), November 2014.  Remote CCS class II chest pain/NYHA class II exertional dyspnea with acceptable limited echocardiographic GXT with exercise to 93% PMHR and only 75% predicted exercise capacity, January 2017.  Echocardiogram 2/27/2019: LV systolic function normal, mild TR, RVSP 25 mmHg, mild to moderate aortic stenosis, LV diastolic dysfunction grade 1 consistent with impaired relaxation, normal RV wall thickness, systolic function and septal motion noted with RV cavity mildly dilated, no pericardial effusion, no pulmonary hypertension, moderate MAC present, there is a bioprosthetic valve present with aortic peak and mean gradients elevated.  CCS class III USA/NYHA class I dyspnea on exertion, April 2021  Echocardiogram, May 2021: LVEF 0.66-0.70. Grade I LV diastolic dysfunction. Mild concentric LVH. Trivial transvalvular regurgitation of the bioprosthetic aortic valve. Mild stenosis or obstruction of the prosthetic aortic valve. Mean gradient 23 mmHg.  [facility], May 2021: Patent SVG-RCA and SVG-OM, LAD with minimal nonobstructive disease. Moderate to severe bioprosthetic aortic valve gradient. LVEDP 25 mmHg.  Residual class I symptoms, September 2021, January 2022, July 2022.  Echocardiogram, 07/22/2022: LVEF 0.68. Bioprosthetic aortic valve present, with peak (290.2 cm/s) and median (20 mmHg) gradients elevated. RVSP from TR normal. Normal RV cavity size, wall thickness, systolic function, and  septal motion. LV diastolic function consistent with grade I impaired relaxation. No evidence of PHTN or pericardial effusion. No significant change from 11 May 2021 and 27 February 2019 echocardiograms.  Echocardiogram 6/16/2023: LVEF 57.3%, Left ventricular diastolic function is consistent with (grade II w/high LAP) pseudonormalization.  Trace AR, there is a bovine bioprosthetic aortic valve present. The aortic valve peak and mean gradients are elevated suggestive of moderate AS.  DIMITRI 1.1 cm², peak velocity of flow distal to the aortic valve 303 cm/s. Aortic valve maximum pressure gradient is 37 mmHg. Aortic valve mean pressure gradient is 20 mmHg. Gradients and velocities are stable from echocardiogram performed July 2022.  CCS class 0 angina/NYHA class II ASCENCIO symptoms February 2024, July 2024  Chronic hypertension, probable essential.   Dyslipidemia; on statin therapy.  Remote tobacco use, with current smokeless tobacco use of 1 can/week.  Remote pneumonia, 1970.  Remote football injury with concussion, 1950.  Chronic lower tract obstructive symptoms, probable BPH.   Post-procedure weight gain and possible mild volume overload, October 2006.  Chronic hypothyroidism/replacement therapy.   Apparent bladder cancer with therapy and intermittent recurrent cystoscopy, autumn 2006 with subsequent cystectomy and positive lymph node with diversion of ureters- data deficit, June 2021 [facility] with subsequent referral to [facility] for treatment followed by 21 radiation treatments and subsequent initiation of immunotherapy.  Remote left podagra/possible gout attack -- data deficit, November 2008.  Erectile dysfunction.   Stage 3 chronic kidney disease.   Sleep apnea with abnormal outpatient screening study with subsequent apparent polysomnogram, data deficit (March 2016).  Ankle edema  Dyspnea September/October 2022 with CTA chest at [place] October 2022 negative for PE, demonstrated atherosclerosis,  mild emphysema, groundglass opacity in the RLL had decreased in size.  Breakthrough COVID November 2022 with mild fatigue and chills, did not require hospitalization  AAA   4.2 cm on abdominal ultrasound September 2022, concern for mild prominence of the proximal iliacs.  Limited visualization due to colostomy bag.  Atherosclerotic disease noted.  CTA abdomen/pelvis October 2022: Chronic abdominal aortic aneurysm in the infrarenal aorta which does not extend into the iliac arteries.  Maximum axial diameter of 4.5 cm, slowly growing over the past 10 years.  Continued surveillance recommended.  CT abdomen/pelvis 6/12/2023 at [facility], [place], operated by [facility] with AAA measuring 4.4 cm  CT abdomen/pelvis December 2023 at [facility], [place], operated by [facility] showing AAA measuring 4.5 cm  CT abdomen/pelvis April 2024 showing grossly stable size of ascending aorta and abdominal aortic aneurysms compared to CT 12/18/2023 .     Allergies   Allergen Reactions    Lipitor [Atorvastatin] Myalgia     arthralgias/myalgias.    Lisinopril Cough     paroxysmal cough.       Current Outpatient Medications   Medication Instructions    aspirin 81 mg, Oral, Daily    atorvastatin (LIPITOR) 40 MG tablet 1 tablet, Oral, Daily    famotidine (PEPCID) 10 mg, Oral, 2 Times Daily PRN    Incontinence Supplies ([place] Entry Close System) misc See Admin Instructions    levothyroxine (SYNTHROID, LEVOTHROID) 25 mcg, Oral, Daily    losartan (COZAAR) 50 MG tablet TAKE 1/2 TABLET BY MOUTH DAILY    nitroglycerin (NITROSTAT) 0.4 MG SL tablet 1 under the tongue as needed for angina, may repeat q5mins for up three doses    Ostomy Supplies ([employer] Flat Barrier 57MM) wafer USE 4 PER WEEK.    Probiotic Product (PROBIOTIC ADVANCED PO) 1 tablet, Oral, Daily    torsemide (DEMADEX) 20 MG tablet 1 tablet, Oral, 2 Times Weekly         HISTORY OF PRESENT ILLNESS:  The patient is here for a 5-month follow-up. Patient is followed at [place] for his AAA and had a CT April 2024 showing  "grossly stable size of ascending aorta and abdominal aortic aneurysms compared to CT 12/18/2023 measuring 4.5 cm. The patient had an echocardiogram before coming to our office today; this will be read, and a letter will be sent to the patient with those results.  He denies any chest pain, shortness of breath, palpitations, dizziness, presyncope, or syncope.  He does some walking for activity but says that he probably needs to do more.  He gets antibiotics before he has dental cleanings.  He has not been checking his blood pressure at home.  He was having some pedal edema and is taking the torsemide twice a week now.  He just returned from [facility] on vacation.  The patient goes for repeat CT abdomen/pelvis in October 2024 at [facility].  Patient will have his labs sent to me for review.    ROS   All other systems reviewed and otherwise negative.    Procedures       Objective:       Vitals:    07/25/24 1037 07/25/24 1040 07/25/24 1053   BP: 140/76 149/79 142/64   BP Location: Left arm Left arm Right arm   Patient Position: Sitting Standing Sitting   Cuff Size: Adult Adult    Pulse: 55     SpO2: 98%     Weight: 81.3 kg (179 lb 3.2 oz)     Height: 175.3 cm (69\")       Body mass index is 26.46 kg/m².  Wt Readings from Last 2 Encounters:   07/25/24 81.3 kg (179 lb 3.2 oz)   07/25/24 83.9 kg (185 lb)        Constitutional:       Appearance: Healthy appearance. Not in distress.   HENT:    Mouth/Throat:      Mouth: No oral lesions.      Dentition: No gum lesions.      Comments: Crowns present  Neck:      Vascular: No JVR. JVD normal.   Pulmonary:      Effort: Pulmonary effort is normal.      Breath sounds: Normal breath sounds. No wheezing. No rhonchi. No rales.   Chest:      Chest wall: Not tender to palpatation.   Cardiovascular:      PMI at left midclavicular line. Normal rate. Regular rhythm. Normal S1. Normal S2.       Murmurs: There is a grade 3/6 systolic murmur at the URSB, radiating to the neck.      No gallop.  No " click. No rub.   Pulses:     Intact distal pulses.   Edema:     Peripheral edema absent.   Abdominal:      General: Bowel sounds are normal.      Palpations: Abdomen is soft.      Tenderness: There is no abdominal tenderness.   Musculoskeletal: Normal range of motion.         General: No tenderness. Skin:     General: Skin is warm and dry.   Neurological:      General: No focal deficit present.      Mental Status: Alert and oriented to person, place and time.           Lab Review:   Lab Results   Component Value Date    GLUCOSE 86 11/10/2022    BUN 18 11/10/2022    CREATININE 1.59 (H) 11/10/2022    EGFRIFNONA 50 (L) 05/11/2021    EGFRIFAFRI 69 07/26/2018    BCR 11.3 11/10/2022    CO2 33.0 (H) 11/10/2022    CALCIUM 9.3 11/10/2022    PROTENTOTREF 7.1 07/26/2018    ALBUMIN 3.90 11/10/2022    LABIL2 1.9 07/26/2018    AST 14 11/10/2022    ALT 15 11/10/2022       Lab Results   Component Value Date    WBC 7.42 11/10/2022    HGB 12.3 (L) 11/10/2022    HCT 37.7 11/10/2022    MCV 85.7 11/10/2022     11/10/2022           Lab Results   Component Value Date    TSH 3.477 03/08/2023         Lab Results   Component Value Date    TRIG 123 07/26/2018     Lab Results   Component Value Date    HDL 56 07/26/2018     Lab Results   Component Value Date    LDL 74 07/26/2018           Lab Results   Component Value Date    BNP 27.0 03/06/2019                 Results for orders placed during the hospital encounter of 06/16/23    Adult Transthoracic Echo Complete w/ Color, Spectral and Contrast if necessary per protocol    Interpretation Summary    Left ventricular systolic function is normal. Calculated left ventricular EF = 57.3% Normal left ventricular cavity size and wall thickness noted. Left ventricular diastolic function is consistent with (grade II w/high LAP) pseudonormalization.    Trace aortic valve regurgitation is present. There is a bovine bioprosthetic aortic valve present. The aortic valve peak and mean gradients are  elevated suggestive of moderate AS.    Moderate aortic valve stenosis is present. Aortic valve area is 1.1 cm2.    Peak velocity of the flow distal to the aortic valve is 303 cm/s. Aortic valve maximum pressure gradient is 37 mmHg. Aortic valve mean pressure gradient is 20 mmHg.  Gradients and velocities are stable from echocardiogram performed July 2022            Advance Care Planning   ACP discussion was held with the patient during this visit. Patient has an advance directive (not in EMR), copy requested.      Assessment:     Overall continued acceptable course with no new interim cardiopulmonary complaints with acceptable functional status. We will defer additional diagnostic or therapeutic intervention from a cardiac perspective at this time. The patient had an echocardiogram before coming to our office today; this will be read, and a letter will be sent to the patient with those results. Hopefully I will get to review the patient's next laboratory testing results.  The patient will increase his losartan to 50 mg daily and call back in 1 week with blood pressure readings. Hopefully I will get to review the patient's next laboratory testing results.       Diagnosis Plan   1. Ischemic heart disease  No recurrent angina pectoris or CHF on current activity schedule; continue current treatment       2. S/P AVR (aortic valve replacement)  The patient had an echocardiogram before coming to our office today; this will be read, and a letter will be sent to the patient with those results.       3. Primary hypertension  The patient will increase his losartan to 50 mg daily and call back in 1 week with blood pressure readings.      4. Dyslipidemia  No new labs to review, continue atorvastatin             Plan:         Patient to continue current medications and close follow up with the above providers.  Tentative cardiology follow up in January 2025 or patient may return sooner PRN.  The patient had an echocardiogram  before coming to our office today; this will be read, and a letter will be sent to the patient with those results.   Increase losartan to 50 mg daily, patient will call back in 1 week with blood pressure readings      Electronically signed by CINDY Bravo, 07/25/24, 10:57 AM EDT.

## 2024-07-25 ENCOUNTER — OFFICE VISIT (OUTPATIENT)
Dept: CARDIOLOGY | Facility: CLINIC | Age: 84
End: 2024-07-25
Payer: MEDICARE

## 2024-07-25 ENCOUNTER — HOSPITAL ENCOUNTER (OUTPATIENT)
Dept: CARDIOLOGY | Facility: HOSPITAL | Age: 84
Discharge: HOME | End: 2024-07-25
Admitting: NURSE PRACTITIONER
Payer: MEDICARE

## 2024-07-25 VITALS
BODY MASS INDEX: 26.54 KG/M2 | HEIGHT: 69 IN | WEIGHT: 179.2 LBS | SYSTOLIC BLOOD PRESSURE: 142 MMHG | OXYGEN SATURATION: 98 % | DIASTOLIC BLOOD PRESSURE: 64 MMHG | HEART RATE: 55 BPM

## 2024-07-25 VITALS
BODY MASS INDEX: 27.4 KG/M2 | SYSTOLIC BLOOD PRESSURE: 149 MMHG | HEIGHT: 69 IN | DIASTOLIC BLOOD PRESSURE: 81 MMHG | WEIGHT: 185 LBS

## 2024-07-25 DIAGNOSIS — I25.9 ISCHEMIC HEART DISEASE: Primary | ICD-10-CM

## 2024-07-25 DIAGNOSIS — I10 PRIMARY HYPERTENSION: ICD-10-CM

## 2024-07-25 DIAGNOSIS — Z95.2 S/P AVR (AORTIC VALVE REPLACEMENT): ICD-10-CM

## 2024-07-25 DIAGNOSIS — E78.5 DYSLIPIDEMIA: ICD-10-CM

## 2024-07-25 LAB
ASCENDING AORTA: 3.4 CM
BH CV ECHO MEAS - AO MAX PG: 35 MMHG
BH CV ECHO MEAS - AO MEAN PG: 20 MMHG
BH CV ECHO MEAS - AO ROOT DIAM: 3 CM
BH CV ECHO MEAS - AO V2 MAX: 295.6 CM/SEC
BH CV ECHO MEAS - AVA(I,D): 1 CM2
BH CV ECHO MEAS - EF(MOD-BP): 54.7 %
BH CV ECHO MEAS - IVS/LVPW: 1 CM
BH CV ECHO MEAS - IVSD: 1.2 CM
BH CV ECHO MEAS - LA DIMENSION: 4.4 CM
BH CV ECHO MEAS - LAT PEAK E' VEL: 9 CM/SEC
BH CV ECHO MEAS - LV MAX PG: 3.7 MMHG
BH CV ECHO MEAS - LV MEAN PG: 1.9 MMHG
BH CV ECHO MEAS - LV V1 MAX: 96.1 CM/SEC
BH CV ECHO MEAS - LV V1 VTI: 24.4 CM
BH CV ECHO MEAS - LVIDD: 4 CM
BH CV ECHO MEAS - LVIDS: 3.3 CM
BH CV ECHO MEAS - LVOT DIAM: 1.9 CM
BH CV ECHO MEAS - LVPWD: 1.2 CM
BH CV ECHO MEAS - MED PEAK E' VEL: 6.8 CM/SEC
BH CV ECHO MEAS - MV A MAX VEL: 87.9 CM/SEC
BH CV ECHO MEAS - MV DEC SLOPE: 376.3 CM/SEC2
BH CV ECHO MEAS - MV E MAX VEL: 79.3 CM/SEC
BH CV ECHO MEAS - MV E/A: 0.9
BH CV ECHO MEAS - MV MAX PG: 3.7 MMHG
BH CV ECHO MEAS - MV MEAN PG: 1.4 MMHG
BH CV ECHO MEAS - MV P1/2T: 71 MSEC
BH CV ECHO MEAS - MV V2 VTI: 34.3 CM
BH CV ECHO MEAS - MVA(P1/2T): 3.1 CM2
BH CV ECHO MEAS - PA ACC TIME: 0.1 SEC
BH CV ECHO MEAS - PA V2 MAX: 95.9 CM/SEC
BH CV ECHO MEAS - PI END-D VEL: 75.6 CM/SEC
BH CV ECHO MEAS - RAP SYSTOLE: 3 MMHG
BH CV ECHO MEAS - RVSP: 18 MMHG
BH CV ECHO MEAS - TAPSE (>1.6): 1.66 CM
BH CV ECHO MEAS - TR MAX PG: 15.4 MMHG
BH CV ECHO MEAS - TR MAX VEL: 196.4 CM/SEC
BH CV ECHO MEASUREMENTS AVERAGE E/E' RATIO: 10.04
BH CV VAS BP LEFT ARM: NORMAL MMHG
BH CV XLRA - RV BASE: 4 CM
BH CV XLRA - RV LENGTH: 7.7 CM
BH CV XLRA - RV MID: 3 CM
BH CV XLRA - TDI S': 11.6 CM/SEC
LEFT ATRIUM VOLUME INDEX: 26.1 ML/M2
LV EF 2D ECHO EST: 60 %

## 2024-07-25 PROCEDURE — 1160F RVW MEDS BY RX/DR IN RCRD: CPT | Performed by: NURSE PRACTITIONER

## 2024-07-25 PROCEDURE — 3077F SYST BP >= 140 MM HG: CPT | Performed by: NURSE PRACTITIONER

## 2024-07-25 PROCEDURE — 1159F MED LIST DOCD IN RCRD: CPT | Performed by: NURSE PRACTITIONER

## 2024-07-25 PROCEDURE — 99214 OFFICE O/P EST MOD 30 MIN: CPT | Performed by: NURSE PRACTITIONER

## 2024-07-25 PROCEDURE — 93306 TTE W/DOPPLER COMPLETE: CPT

## 2024-07-25 PROCEDURE — 3078F DIAST BP <80 MM HG: CPT | Performed by: NURSE PRACTITIONER

## 2024-07-25 RX ORDER — LOSARTAN POTASSIUM 50 MG/1
50 TABLET ORAL DAILY
Qty: 45 TABLET | Refills: 1 | Status: SHIPPED | OUTPATIENT
Start: 2024-07-25

## 2024-10-28 RX ORDER — LOSARTAN POTASSIUM 50 MG/1
50 TABLET ORAL DAILY
Qty: 45 TABLET | Refills: 2 | Status: SHIPPED | OUTPATIENT
Start: 2024-10-28

## 2025-01-27 NOTE — PROGRESS NOTES
Subjective:     Encounter Date:01/31/2025      Patient ID: Sherron Boyle is a 84 y.o.  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     PHYSICIAN:  Sandy Corcoran MD  REMOTE INTERNIST: Jose Martin Munguia MD  CARDIOVASCULAR SURGEON: Kali Curry MD   UROLOGIST:  Jayce Delgado MD  SLEEP MD:  Khloe Caputo MD  ANGIOGRAPHER: Cody Vallecillo MD, [facility]  UROLOGIC SURGEON: Naldo Cheney MD  RADIATION ONCOLOGIST: Lanre Akins MD ([place])  ONCOLOGIST/HEMATOLOGIST:  Viviana Cantrell MD ([place]).    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 LVEF (0.58), July 2006.  Severe 2.5-vessel obstructive coronary artery disease with 3-vessel coronary disease and severe aortic stenosis with subsequent aortocoronary bypass graft placement x2 (RSVG PDA and RCA; RSVG distal OM nondominant LCx) with #21 Magna precordial aortic valve replacement and reduction aortic valve annuloplasty with possible small non ST elevation myocardial infarction and acceptable discharge echocardiographic study, August 2006.   Residual CCS class I anginal pectoris/NYHA class II exertional dyspnea/fatigue syndrome with acceptable echocardiogram. LVEF (0.62), December 2007.   BRENDA revealing grade II atheromatous disease of the transverse aorta and evidence of grade III atheroma in the descending aorta (atheroma less than or equal to 5 mm) with mild concentric LVH, normal sinus rhythm with negative IV saline contrast study and LVEF estimated at greater than 60%, 09/17/2009.  Acceptable  echocardiographic GXT. LVEF (0.64) with nominal aortic valve function and mild left atrial enlargement with acceptable ascending, transverse, and proximal descending thoracic aorta, November 2009.  Remote dizziness/presyncope with transient chest pain and abnormal event recorder with acceptable combination Doppler echocardiogram and carotid duplex study as well as Cardiolite GXT, spring 2013, with residual class I symptoms.  Residual class I symptoms with acceptable stable echocardiogram. LVEF (0.72), December 2011; LVEF (0.65), November 2014.  Remote CCS class II chest pain/NYHA class II exertional dyspnea with acceptable limited echocardiographic GXT with exercise to 93% PMHR and only 75% predicted exercise capacity, January 2017.  Echocardiogram 2/27/2019: LV systolic function normal, mild TR, RVSP 25 mmHg, mild to moderate aortic stenosis, LV diastolic dysfunction grade 1 consistent with impaired relaxation, normal RV wall thickness, systolic function and septal motion noted with RV cavity mildly dilated, no pericardial effusion, no pulmonary hypertension, moderate MAC present, there is a bioprosthetic valve present with aortic peak and mean gradients elevated.  CCS class III USA/NYHA class I dyspnea on exertion, April 2021  Echocardiogram, May 2021: LVEF 0.66-0.70. Grade I LV diastolic dysfunction. Mild concentric LVH. Trivial transvalvular regurgitation of the bioprosthetic aortic valve. Mild stenosis or obstruction of the prosthetic aortic valve. Mean gradient 23 mmHg.  [facility], May 2021: Patent SVG-RCA and SVG-OM, LAD with minimal nonobstructive disease. Moderate to severe bioprosthetic aortic valve gradient. LVEDP 25 mmHg.  Residual class I symptoms, September 2021, January 2022, July 2022.  Echocardiogram, 07/22/2022: LVEF 0.68. Bioprosthetic aortic valve present, with peak (290.2 cm/s) and median (20 mmHg) gradients elevated. RVSP from TR normal. Normal RV cavity size, wall thickness, systolic function, and  septal motion. LV diastolic function consistent with grade I impaired relaxation. No evidence of PHTN or pericardial effusion. No significant change from 11 May 2021 and 27 February 2019 echocardiograms.  Echocardiogram 6/16/2023: LVEF 57.3%, Left ventricular diastolic function is consistent with (grade II w/high LAP) pseudonormalization.  Trace AR, there is a bovine bioprosthetic aortic valve present. The aortic valve peak and mean gradients are elevated suggestive of moderate AS.  DIMITRI 1.1 cm², peak velocity of flow distal to the aortic valve 303 cm/s. Aortic valve maximum pressure gradient is 37 mmHg. Aortic valve mean pressure gradient is 20 mmHg. Gradients and velocities are stable from echocardiogram performed July 2022.  Echocardiogram 7/25/2024: LVEF 60%, bioprosthetic aortic valve present with mildly thickened leaflets and mildly increased gradients.  Aortic valve maximum pressure gradient 35 mmHg, mean pressure gradient 20 mmHg, DIMITRI 1 cm², mild to moderate aortic stenosis, aortic valve peak velocity 295.6 cm/s, ascending aorta 3.4 cm, aortic root 3 cm  CCS class 0 angina/NYHA class II ASCENCIO symptoms February 2024, July 2024  Chronic hypertension, probable essential.   Dyslipidemia; on statin therapy.  Remote tobacco use, with current smokeless tobacco use of 1 can/week.  Remote pneumonia, 1970.  Remote football injury with concussion, 1950.  Chronic lower tract obstructive symptoms, probable BPH.   Post-procedure weight gain and possible mild volume overload, October 2006.  Chronic hypothyroidism/replacement therapy.   Apparent bladder cancer with therapy and intermittent recurrent cystoscopy, autumn 2006 with subsequent cystectomy and positive lymph node with diversion of ureters- data deficit, June 2021 [facility] with subsequent referral to [facility] for treatment followed by 21 radiation treatments and subsequent initiation of immunotherapy.  Remote left podagra/possible gout attack --  data deficit, November 2008.  Erectile dysfunction.   Stage 3 chronic kidney disease.   Sleep apnea with abnormal outpatient screening study with subsequent apparent polysomnogram, data deficit (March 2016).  Ankle edema  Dyspnea September/October 2022 with CTA chest at [place] October 2022 negative for PE, demonstrated atherosclerosis, mild emphysema, groundglass opacity in the RLL had decreased in size.  Breakthrough COVID November 2022 with mild fatigue and chills, did not require hospitalization  AAA   4.2 cm on abdominal ultrasound September 2022, concern for mild prominence of the proximal iliacs.  Limited visualization due to colostomy bag.  Atherosclerotic disease noted.  CTA abdomen/pelvis October 2022: Chronic abdominal aortic aneurysm in the infrarenal aorta which does not extend into the iliac arteries.  Maximum axial diameter of 4.5 cm, slowly growing over the past 10 years.  Continued surveillance recommended.  CT abdomen/pelvis 6/12/2023 at [facility] with AAA measuring 4.4 cm  CT abdomen/pelvis December 2023 at [facility] showing AAA measuring 4.5 cm  CT abdomen/pelvis April 2024 showing grossly stable size of ascending aorta and abdominal aortic aneurysms compared to CT 12/18/2023 .        Allergies   Allergen Reactions    Lipitor [Atorvastatin] Myalgia     arthralgias/myalgias.    Lisinopril Cough     paroxysmal cough.       Current Outpatient Medications   Medication Instructions    aspirin 81 mg, Daily    atorvastatin (LIPITOR) 40 MG tablet 1 tablet, Daily    famotidine (PEPCID) 10 mg, 2 Times Daily PRN    Incontinence Supplies ([employer]) misc See Admin Instructions    levothyroxine (SYNTHROID, LEVOTHROID) 25 mcg, Daily    losartan (COZAAR) 50 mg, Oral, Daily    nitroglycerin (NITROSTAT) 0.4 MG SL tablet 1 under the tongue as needed for angina, may repeat q5mins for up three doses    Ostomy Supplies (New Image Flat Barrier 57MM) wafer USE 4 PER WEEK.     Probiotic Product (PROBIOTIC ADVANCED PO) 1 tablet, Daily    torsemide (DEMADEX) 20 MG tablet 1 tablet, 2 Times Weekly         HISTORY OF PRESENT ILLNESS:  The patient is here for a 5-month follow-up. Patient is followed at [place] for his AAA and had a CT April 2024 showing grossly stable size of ascending aorta and abdominal aortic aneurysms compared to CT 12/18/2023 measuring 4.5 cm.  Echocardiogram July 2024 showed LVEF 60%, bioprosthetic aortic valve present with mildly thickened leaflets and mildly increased gradients.  Aortic valve maximum pressure gradient 35 mmHg, mean pressure gradient 20 mmHg, DIMITRI 1 cm², mild to moderate aortic stenosis, aortic valve peak velocity 295.6 cm/s, ascending aorta 3.4 cm, aortic root 3 cm.  Patient denies any chest pain, shortness of breath, palpitations, dizziness, presyncope, or syncope.  He does some walking for activity but is hindered some by left hip pain.  His wife is encouraging him to go to the doctor to get some x-rays.  He thinks it is bursitis.  Patient gets antibiotics before he has dental cleanings.  Blood pressures have been WNL.  He goes for labs with his primary care physician in February 2025 and will have the results of this sent to me for review.  He has a repeat AAA scan at [facility] in April 2025 and says that the last time it had not grown and was around 4.5-4.6 cm.  Patient uses his torsemide about twice a week.      ROS   All other systems reviewed and otherwise negative.      ECG 12 Lead    Date/Time: 1/31/2025 10:13 AM  Performed by: Clarisse Vernon APRN    Authorized by: Clarisse Vernon APRN  Rhythm comments: Normal sinus rhythm, cannot rule out anterior infarct, age undetermined, abnormal ECG, 60 bpm, QRS 72 ms, QTc 412 ms,  ms, no significant changes from last ECG February 2024             Objective:       Vitals:    01/31/25 0953 01/31/25 0954   BP: 126/72 130/74   BP Location: Right arm Right arm   Patient Position: Sitting Standing  "  Cuff Size: Adult Adult   Pulse:  68   SpO2: 99% 99%   Weight: 83.9 kg (185 lb) 83.9 kg (185 lb)   Height: 177.8 cm (70\") 177.8 cm (70\")     Body mass index is 26.54 kg/m².  Wt Readings from Last 2 Encounters:   01/31/25 83.9 kg (185 lb)   07/25/24 83.9 kg (185 lb)        Constitutional:       Appearance: Healthy appearance. Not in distress.   HENT:    Mouth/Throat:      Comments: Crowns present  Neck:      Vascular: No JVR. JVD normal.   Pulmonary:      Effort: Pulmonary effort is normal.      Breath sounds: Normal breath sounds. No wheezing. No rhonchi. No rales.   Chest:      Chest wall: Not tender to palpatation.   Cardiovascular:      PMI at left midclavicular line. Normal rate. Regular rhythm. Normal S1. Normal S2.       Murmurs: There is a grade 3/6 systolic murmur at the URSB and LLSB, radiating to the neck.      No gallop.  No click. No rub.   Pulses:     Intact distal pulses.   Edema:     Peripheral edema absent.   Abdominal:      General: Bowel sounds are normal.      Palpations: Abdomen is soft.      Tenderness: There is no abdominal tenderness.   Musculoskeletal: Normal range of motion.         General: No tenderness. Skin:     General: Skin is warm and dry.   Neurological:      General: No focal deficit present.      Mental Status: Alert and oriented to person, place and time.           Lab Review:   Lab Results   Component Value Date    GLUCOSE 86 11/10/2022    BUN 18 11/10/2022    CREATININE 1.59 (H) 11/10/2022    EGFRIFNONA 50 (L) 05/11/2021    EGFRIFAFRI 69 07/26/2018    BCR 11.3 11/10/2022    CO2 33.0 (H) 11/10/2022    CALCIUM 9.3 11/10/2022    PROTENTOTREF 7.1 07/26/2018    ALBUMIN 3.90 11/10/2022    LABIL2 1.9 07/26/2018    AST 14 11/10/2022    ALT 15 11/10/2022       Lab Results   Component Value Date    WBC 7.42 11/10/2022    HGB 12.3 (L) 11/10/2022    HCT 37.7 11/10/2022    MCV 85.7 11/10/2022     11/10/2022           Lab Results   Component Value Date    TSH 3.477 03/08/2023 "         Lab Results   Component Value Date    TRIG 123 07/26/2018     Lab Results   Component Value Date    HDL 56 07/26/2018     Lab Results   Component Value Date    LDL 74 07/26/2018           Lab Results   Component Value Date    BNP 27.0 03/06/2019 5/28/2024:  CBC: WBC 3.9, RBC 4.64, hemoglobin 13.2, hematocrit 39.9, MCV 86, MCH 28.4, MCHC 33.1, RDW 14.8, platelets 201, neutrophils 66, lymphocytes 18, monocytes 11, eos 4, basos 1  TSH 4.15  T4-7.9  CMP: Glucose 99, BUN 21, creatinine 1.52, GFR 45, sodium 140, potassium 4.5, chloride 102, carbon dioxide 26, calcium 9.5, protein 6.5, albumin 4.3, globulin 2.2, bilirubin 1, alkaline phosphatase 99, AST 17, ALT 13          Results for orders placed in visit on 02/21/24    Adult Transthoracic Echo Complete W/ Cont if Necessary Per Protocol    Interpretation Summary    Left ventricular systolic function is normal. Estimated left ventricular EF = 60%    There is a bioprosthetic aortic valve present with mildly thickened leaflets and mildly increased gradients..    Aortic valve maximum pressure gradient is 35 mmHg. Aortic valve mean pressure gradient is 20 mmHg.            Advance Care Planning   ACP discussion was held with the patient during this visit. Patient has an advance directive (not in EMR), copy requested.      Assessment:     Overall continued acceptable course with no new interim cardiopulmonary complaints with good functional status. We will defer additional diagnostic or therapeutic intervention from a cardiac perspective at this time. Echocardiogram July 2024 showed LVEF 60%, bioprosthetic aortic valve present with mildly thickened leaflets and mildly increased gradients.  Aortic valve maximum pressure gradient 35 mmHg, mean pressure gradient 20 mmHg, DIMIRTI 1 cm², mild to moderate aortic stenosis, aortic valve peak velocity 295.6 cm/s, ascending aorta 3.4 cm, aortic root 3 cm. Patient is followed at [facility] for his AAA and had a CT April 2024  showing grossly stable size of ascending aorta and abdominal aortic aneurysms compared to CT 12/18/2023 measuring 4.5 cm.  He will have an upcoming AAA screening in April 2025.  Will plan on stress test and echocardiogram in 2026.     Diagnosis Plan   1. Ischemic heart disease  No recurrent angina pectoris or CHF on current activity schedule; continue current treatment       2. S/P AVR (aortic valve replacement)  Echocardiogram July 2024 showed LVEF 60%, bioprosthetic aortic valve present with mildly thickened leaflets and mildly increased gradients.  Aortic valve maximum pressure gradient 35 mmHg, mean pressure gradient 20 mmHg, DIMITRI 1 cm², mild to moderate aortic stenosis, aortic valve peak velocity 295.6 cm/s, ascending aorta 3.4 cm, aortic root 3 cm.      3. Primary hypertension  Controlled, continue current cardiac medications      4. Dyslipidemia  No new labs to review, continue atorvastatin             Plan:         Patient to continue current medications and close follow up with the above providers.  Tentative cardiology follow up in July 2025 or patient may return sooner PRN.  Will plan on stress test and echocardiogram in 2026      Electronically signed by CINDY Bravo, 01/31/25, 10:15 AM EST.

## 2025-01-31 ENCOUNTER — OFFICE VISIT (OUTPATIENT)
Dept: CARDIOLOGY | Facility: CLINIC | Age: 85
End: 2025-01-31
Payer: MEDICARE

## 2025-01-31 VITALS
BODY MASS INDEX: 26.48 KG/M2 | HEIGHT: 70 IN | WEIGHT: 185 LBS | OXYGEN SATURATION: 99 % | DIASTOLIC BLOOD PRESSURE: 74 MMHG | SYSTOLIC BLOOD PRESSURE: 130 MMHG | HEART RATE: 68 BPM

## 2025-01-31 DIAGNOSIS — Z95.2 S/P AVR (AORTIC VALVE REPLACEMENT): ICD-10-CM

## 2025-01-31 DIAGNOSIS — E78.5 DYSLIPIDEMIA: ICD-10-CM

## 2025-01-31 DIAGNOSIS — I25.9 ISCHEMIC HEART DISEASE: Primary | ICD-10-CM

## 2025-01-31 DIAGNOSIS — I10 PRIMARY HYPERTENSION: ICD-10-CM

## 2025-01-31 PROCEDURE — 1160F RVW MEDS BY RX/DR IN RCRD: CPT | Performed by: NURSE PRACTITIONER

## 2025-01-31 PROCEDURE — 99214 OFFICE O/P EST MOD 30 MIN: CPT | Performed by: NURSE PRACTITIONER

## 2025-01-31 PROCEDURE — 3078F DIAST BP <80 MM HG: CPT | Performed by: NURSE PRACTITIONER

## 2025-01-31 PROCEDURE — 3074F SYST BP LT 130 MM HG: CPT | Performed by: NURSE PRACTITIONER

## 2025-01-31 PROCEDURE — 93000 ELECTROCARDIOGRAM COMPLETE: CPT | Performed by: NURSE PRACTITIONER

## 2025-01-31 PROCEDURE — 1159F MED LIST DOCD IN RCRD: CPT | Performed by: NURSE PRACTITIONER

## 2025-03-14 RX ORDER — LOSARTAN POTASSIUM 50 MG/1
50 TABLET ORAL DAILY
Qty: 45 TABLET | Refills: 2 | Status: SHIPPED | OUTPATIENT
Start: 2025-03-14

## 2025-08-22 RX ORDER — LOSARTAN POTASSIUM 50 MG/1
50 TABLET ORAL DAILY
Qty: 90 TABLET | Refills: 2 | Status: SHIPPED | OUTPATIENT
Start: 2025-08-22

## (undated) DEVICE — MODEL BT2000 P/N 700287-012KIT CONTENTS: MANIFOLD WITH SALINE AND CONTRAST PORTS, SALINE TUBING WITH SPIKE AND HAND SYRINGE, TRANSDUCER: Brand: BT2000 AUTOMATED MANIFOLD KIT

## (undated) DEVICE — GW PERIPH GUIDERIGHT STD/EXCHNG/J/TIP SS 0.035IN 5X260CM

## (undated) DEVICE — PK CATH CARD 10

## (undated) DEVICE — CATH DIAG EXPO .045 AL1 5F 100CM

## (undated) DEVICE — MODEL AT P65, P/N 701554-001KIT CONTENTS: HAND CONTROLLER, 3-WAY HIGH-PRESSURE STOPCOCK WITH ROTATING END AND PREMIUM HIGH-PRESSURE TUBING: Brand: ANGIOTOUCH® KIT

## (undated) DEVICE — CATH DIAG EXPO .045 FL3  5F 100CM

## (undated) DEVICE — GLIDESHEATH SLENDER STAINLESS STEEL KIT: Brand: GLIDESHEATH SLENDER

## (undated) DEVICE — CATH DIAG EXPO M/ PK 5F FL4/FR4 PIG